# Patient Record
Sex: FEMALE | Race: WHITE | NOT HISPANIC OR LATINO | Employment: FULL TIME | URBAN - NONMETROPOLITAN AREA
[De-identification: names, ages, dates, MRNs, and addresses within clinical notes are randomized per-mention and may not be internally consistent; named-entity substitution may affect disease eponyms.]

---

## 2022-04-29 ENCOUNTER — OFFICE VISIT (OUTPATIENT)
Dept: FAMILY MEDICINE CLINIC | Facility: CLINIC | Age: 52
End: 2022-04-29

## 2022-04-29 VITALS
HEART RATE: 75 BPM | HEIGHT: 64 IN | SYSTOLIC BLOOD PRESSURE: 130 MMHG | DIASTOLIC BLOOD PRESSURE: 80 MMHG | BODY MASS INDEX: 31.58 KG/M2 | OXYGEN SATURATION: 96 % | WEIGHT: 185 LBS

## 2022-04-29 DIAGNOSIS — K76.0 FATTY LIVER DISEASE, NONALCOHOLIC: ICD-10-CM

## 2022-04-29 DIAGNOSIS — K21.9 GASTROESOPHAGEAL REFLUX DISEASE WITHOUT ESOPHAGITIS: Primary | ICD-10-CM

## 2022-04-29 PROCEDURE — 99204 OFFICE O/P NEW MOD 45 MIN: CPT | Performed by: PHYSICIAN ASSISTANT

## 2022-04-29 RX ORDER — PANTOPRAZOLE SODIUM 40 MG/1
TABLET, DELAYED RELEASE ORAL
Qty: 90 TABLET | Refills: 1 | Status: SHIPPED | OUTPATIENT
Start: 2022-04-29 | End: 2022-05-15

## 2022-05-02 ENCOUNTER — TELEPHONE (OUTPATIENT)
Dept: FAMILY MEDICINE CLINIC | Facility: CLINIC | Age: 52
End: 2022-05-02

## 2022-05-03 NOTE — PROGRESS NOTES
"Chief Complaint  Abdominal Pain (Patient states that she has been having abdominal pain do to her hernia and it is causing her to have acid reflux and poor appetite. ) and Labs Only (Patient reports that she recently had labs done at [facility] [place] and was told that she had a fatty liver)    Subjective          Marisabel Burns presents to [facility]  Patient reports going to ER secondary to having abdominal and chest pain.  States she was advised her pain was secondary to reflux and advised to follow up with PCP.  Reports having a CT scan and was diagnosed with a fatty liver she would like to follow up with GI    Abdominal Pain  This is a chronic problem. The current episode started more than 1 month ago. The onset quality is sudden. The problem occurs every several days. The most recent episode lasted 1 days.       Objective   Vital Signs:   /80 (BP Location: Right arm, Patient Position: Sitting, Cuff Size: Adult)   Pulse 75   Ht 162.6 cm (64\")   Wt 83.9 kg (185 lb)   SpO2 96%   BMI 31.76 kg/m²     Physical Exam  Vitals and nursing note reviewed.   Constitutional:       General: She is not in acute distress.     Appearance: Normal appearance.   HENT:      Head: Normocephalic.      Right Ear: Hearing normal.      Left Ear: Hearing normal.   Eyes:      Pupils: Pupils are equal, round, and reactive to light.   Cardiovascular:      Rate and Rhythm: Normal rate and regular rhythm.   Pulmonary:      Effort: Pulmonary effort is normal. No respiratory distress.   Skin:     General: Skin is warm and dry.   Neurological:      Mental Status: She is alert.   Psychiatric:         Mood and Affect: Mood normal.        Result Review :                 Assessment and Plan    Diagnoses and all orders for this visit:    1. Gastroesophageal reflux disease without esophagitis (Primary)  Assessment & Plan:  Patient will start pantoprazole for relief of symptoms    Orders:  -     pantoprazole (Protonix) 40 MG " EC tablet; Take 1 tab po daily for acid reflux  Dispense: 90 tablet; Refill: 1    2. Fatty liver disease, nonalcoholic  Assessment & Plan:  Patient provided with referral to GI    Orders:  -     Ambulatory Referral to Gastroenterology             Follow Up   No follow-ups on file.  Patient was given instructions and counseling regarding her condition or for health maintenance advice. Please see specific information pulled into the AVS if appropriate.

## 2022-05-04 ENCOUNTER — OFFICE VISIT (OUTPATIENT)
Dept: GASTROENTEROLOGY | Facility: CLINIC | Age: 52
End: 2022-05-04

## 2022-05-04 VITALS
HEIGHT: 64 IN | SYSTOLIC BLOOD PRESSURE: 130 MMHG | TEMPERATURE: 96.9 F | WEIGHT: 189 LBS | BODY MASS INDEX: 32.27 KG/M2 | DIASTOLIC BLOOD PRESSURE: 70 MMHG

## 2022-05-04 DIAGNOSIS — K21.9 GASTROESOPHAGEAL REFLUX DISEASE, UNSPECIFIED WHETHER ESOPHAGITIS PRESENT: ICD-10-CM

## 2022-05-04 DIAGNOSIS — K76.0 NAFLD (NONALCOHOLIC FATTY LIVER DISEASE): ICD-10-CM

## 2022-05-04 DIAGNOSIS — R10.13 EPIGASTRIC PAIN: Primary | ICD-10-CM

## 2022-05-04 DIAGNOSIS — Z12.11 SCREEN FOR COLON CANCER: ICD-10-CM

## 2022-05-04 PROCEDURE — 99204 OFFICE O/P NEW MOD 45 MIN: CPT | Performed by: NURSE PRACTITIONER

## 2022-05-04 RX ORDER — ONDANSETRON 4 MG/1
4 TABLET, ORALLY DISINTEGRATING ORAL AS NEEDED
COMMUNITY
Start: 2022-03-30

## 2022-05-04 RX ORDER — DICYCLOMINE HCL 20 MG
20 TABLET ORAL AS NEEDED
COMMUNITY
Start: 2022-03-30

## 2022-05-04 NOTE — PROGRESS NOTES
GASTROENTEROLOGY OFFICE NOTE  Marisabel Burns  [id number]  1970    CARE TEAM  Patient Care Team:  Karlene Ronquillo PA-C as PCP - General (Physician Assistant)    Referring Provider: Karlene Ronquillo PA-C    Chief Complaint   Patient presents with   • Hepatic Disease   • Vomiting   • Diarrhea   • Abdominal Pain   • Nausea   • Heartburn        HISTORY OF PRESENT ILLNESS:  Patient is a 51 year old female with complaints of intermittent epigastric pain associated with nausea. She has history of heartburn that started about a year ago occurring infrequently taking TUMS or omeprazole prn with relief. Over the past few months her heartburn has been more frequent, now having problems almost daily. She recently started on pantoprazole 40 mg daily but took her first dose only yesterday. She denies dysphagia, odynophagia, early satiety.     She reports that she was seen in the ER in [place] recently for complaints of epigastric pain and a CT was done that showed fatty liver. She has never had a colonoscopy.    PAST MEDICAL HISTORY  Past Medical History:   Diagnosis Date   • GERD (gastroesophageal reflux disease)    • Liver disease         PAST SURGICAL HISTORY  Past Surgical History:   Procedure Laterality Date   • HYSTERECTOMY     • TUBAL ABDOMINAL LIGATION          MEDICATIONS:    Current Outpatient Medications:   •  dicyclomine (BENTYL) 20 MG tablet, Take 20 mg by mouth As Needed., Disp: , Rfl:   •  ondansetron ODT (ZOFRAN-ODT) 4 MG disintegrating tablet, Place 4 mg under the tongue As Needed., Disp: , Rfl:   •  pantoprazole (Protonix) 40 MG EC tablet, Take 1 tab po daily for acid reflux, Disp: 90 tablet, Rfl: 1    ALLERGIES  No Known Allergies    FAMILY HISTORY:  Family History   Problem Relation Age of Onset   • Liver cancer Neg Hx    • Liver disease Neg Hx    • Rectal cancer Neg Hx    • Stomach cancer Neg Hx    • Colon cancer Neg Hx        SOCIAL HISTORY  Social History     Socioeconomic History   • Marital status:  "   Tobacco Use   • Smoking status: Former Smoker   • Smokeless tobacco: Never Used   Vaping Use   • Vaping Use: Never used   Substance and Sexual Activity   • Alcohol use: Never   • Drug use: Never   • Sexual activity: Defer         PHYSICAL EXAM   /70 (BP Location: Left arm, Patient Position: Sitting, Cuff Size: Adult)   Temp 96.9 °F (36.1 °C) (Temporal)   Ht 162.6 cm (64\")   Wt 85.7 kg (189 lb)   BMI 32.44 kg/m²   Physical Exam  Vitals and nursing note reviewed.   Constitutional:       Appearance: Normal appearance. She is well-developed.   HENT:      Head: Normocephalic and atraumatic.      Nose: Nose normal.      Mouth/Throat:      Mouth: Mucous membranes are moist.      Pharynx: Oropharynx is clear.   Eyes:      Pupils: Pupils are equal, round, and reactive to light.   Cardiovascular:      Rate and Rhythm: Normal rate and regular rhythm.   Pulmonary:      Effort: Pulmonary effort is normal.      Breath sounds: Normal breath sounds. No wheezing or rales.   Abdominal:      General: Bowel sounds are normal.      Palpations: Abdomen is soft. There is no mass.      Tenderness: There is no abdominal tenderness. There is no guarding or rebound.      Hernia: No hernia is present.   Musculoskeletal:         General: Normal range of motion.      Cervical back: Normal range of motion and neck supple.   Skin:     General: Skin is warm and dry.   Neurological:      Mental Status: She is alert and oriented to person, place, and time.      Cranial Nerves: No cranial nerve deficit.   Psychiatric:         Behavior: Behavior normal.         Judgment: Judgment normal.             ASSESSMENT / PLAN  1. Epigastric pain  2. GERD  -Pantoprazole 40 mg daily  -EGD  3. NAFLD  -Obtain records [place] [facility] [place]  -Weight loss 3-5% with diet and exercise  4. Screen for colon cancer  -Colonoscopy for colorectal cancer screening    Return for Follow up after procedures.    I discussed the patients findings and my recommendations with " patient    Jose Francisco Renteria, APRN

## 2022-05-15 DIAGNOSIS — K21.9 GASTROESOPHAGEAL REFLUX DISEASE WITHOUT ESOPHAGITIS: Primary | ICD-10-CM

## 2022-05-15 RX ORDER — OMEPRAZOLE 40 MG/1
40 CAPSULE, DELAYED RELEASE ORAL DAILY
Qty: 90 CAPSULE | Refills: 1 | Status: SHIPPED | OUTPATIENT
Start: 2022-05-15

## 2022-05-16 ENCOUNTER — TELEPHONE (OUTPATIENT)
Dept: FAMILY MEDICINE CLINIC | Facility: CLINIC | Age: 52
End: 2022-05-16

## 2022-06-21 RX ORDER — SODIUM, POTASSIUM,MAG SULFATES 17.5-3.13G
2 SOLUTION, RECONSTITUTED, ORAL ORAL TAKE AS DIRECTED
Qty: 354 ML | Refills: 0 | Status: SHIPPED | OUTPATIENT
Start: 2022-06-21 | End: 2022-07-27

## 2022-07-06 ENCOUNTER — OUTSIDE FACILITY SERVICE (OUTPATIENT)
Dept: GASTROENTEROLOGY | Facility: CLINIC | Age: 52
End: 2022-07-06

## 2022-07-06 PROCEDURE — 88305 TISSUE EXAM BY PATHOLOGIST: CPT | Performed by: INTERNAL MEDICINE

## 2022-07-06 PROCEDURE — 43239 EGD BIOPSY SINGLE/MULTIPLE: CPT | Performed by: INTERNAL MEDICINE

## 2022-07-06 PROCEDURE — 45378 DIAGNOSTIC COLONOSCOPY: CPT | Performed by: INTERNAL MEDICINE

## 2022-07-07 ENCOUNTER — LAB REQUISITION (OUTPATIENT)
Dept: LAB | Facility: HOSPITAL | Age: 52
End: 2022-07-07

## 2022-07-07 DIAGNOSIS — K44.9 DIAPHRAGMATIC HERNIA WITHOUT OBSTRUCTION OR GANGRENE: ICD-10-CM

## 2022-07-07 DIAGNOSIS — K21.9 GASTRO-ESOPHAGEAL REFLUX DISEASE WITHOUT ESOPHAGITIS: ICD-10-CM

## 2022-07-07 DIAGNOSIS — K20.90 ESOPHAGITIS, UNSPECIFIED WITHOUT BLEEDING: ICD-10-CM

## 2022-07-07 DIAGNOSIS — K29.70 GASTRITIS, UNSPECIFIED, WITHOUT BLEEDING: ICD-10-CM

## 2022-07-07 DIAGNOSIS — R10.13 EPIGASTRIC PAIN: ICD-10-CM

## 2022-07-08 LAB
CYTO UR: NORMAL
LAB AP CASE REPORT: NORMAL
LAB AP CLINICAL INFORMATION: NORMAL
PATH REPORT.FINAL DX SPEC: NORMAL
PATH REPORT.GROSS SPEC: NORMAL

## 2022-07-27 ENCOUNTER — OFFICE VISIT (OUTPATIENT)
Dept: GASTROENTEROLOGY | Facility: CLINIC | Age: 52
End: 2022-07-27

## 2022-07-27 VITALS
TEMPERATURE: 97.5 F | DIASTOLIC BLOOD PRESSURE: 80 MMHG | BODY MASS INDEX: 32.44 KG/M2 | SYSTOLIC BLOOD PRESSURE: 130 MMHG | HEIGHT: 64 IN | WEIGHT: 190 LBS | RESPIRATION RATE: 18 BRPM | HEART RATE: 74 BPM

## 2022-07-27 DIAGNOSIS — K21.00 GASTROESOPHAGEAL REFLUX DISEASE WITH ESOPHAGITIS WITHOUT HEMORRHAGE: Primary | ICD-10-CM

## 2022-07-27 PROCEDURE — 99213 OFFICE O/P EST LOW 20 MIN: CPT | Performed by: NURSE PRACTITIONER

## 2022-07-27 NOTE — PROGRESS NOTES
GASTROENTEROLOGY OFFICE NOTE  Marisabel Burns  [id number]  1970    CARE TEAM  Patient Care Team:  Karlene Ronquillo PA-C as PCP - General (Physician Assistant)    Referring Provider: Karlene Ronquillo PA-C     Chief Complaint   Patient presents with   • follow up after EGD and colonoscopy.    • Nausea        HISTORY OF PRESENT ILLNESS:  Patient returns in follow up s/p EGD for complaints of epigastric pain and nausea and s/p screening colonoscopy.     EGD was significant for esophagitis, gastritis and an early gastric angiodysplasia. Biopsies significant for reflux esophagitis. She has been taking Pantoprazole 40 mg daily and has had complete resolution of her symptoms, as long as she does not miss a dose.     On colonoscopy, there were a few diverticula, no polyps and multiple non-bleeding angio dysplasias throughout the colon (cecum, ascending colon, transverse colon).    PAST MEDICAL HISTORY  Past Medical History:   Diagnosis Date   • GERD (gastroesophageal reflux disease)    • Liver disease         PAST SURGICAL HISTORY  Past Surgical History:   Procedure Laterality Date   • HYSTERECTOMY     • TUBAL ABDOMINAL LIGATION          MEDICATIONS:    Current Outpatient Medications:   •  dicyclomine (BENTYL) 20 MG tablet, Take 20 mg by mouth As Needed., Disp: , Rfl:   •  omeprazole (priLOSEC) 40 MG capsule, Take 1 capsule by mouth Daily., Disp: 90 capsule, Rfl: 1  •  ondansetron ODT (ZOFRAN-ODT) 4 MG disintegrating tablet, Place 4 mg under the tongue As Needed., Disp: , Rfl:     ALLERGIES  No Known Allergies    FAMILY HISTORY:  Family History   Problem Relation Age of Onset   • Liver cancer Neg Hx    • Liver disease Neg Hx    • Rectal cancer Neg Hx    • Stomach cancer Neg Hx    • Colon cancer Neg Hx        SOCIAL HISTORY  Social History     Socioeconomic History   • Marital status:    Tobacco Use   • Smoking status: Former Smoker   • Smokeless tobacco: Never Used   Vaping Use   • Vaping Use: Never used  "  Substance and Sexual Activity   • Alcohol use: Never   • Drug use: Never   • Sexual activity: Defer           PHYSICAL EXAM   /80   Pulse 74   Temp 97.5 °F (36.4 °C)   Resp 18   Ht 162.6 cm (64\")   Wt 86.2 kg (190 lb)   BMI 32.61 kg/m²   Physical Exam  Constitutional:       Appearance: Normal appearance.   HENT:      Head: Normocephalic and atraumatic.   Pulmonary:      Effort: Pulmonary effort is normal.   Abdominal:      General: There is no distension.      Palpations: Abdomen is soft.      Tenderness: There is no abdominal tenderness.   Neurological:      Mental Status: She is alert and oriented to person, place, and time.   Psychiatric:         Mood and Affect: Mood normal.         Thought Content: Thought content normal.           Results Review:  Case Report   Surgical Pathology Report                         Case: [id number]                                   Authorizing Provider:  Xavier Edward MD   Collected:           07/06/2022 01:15 PM           Ordering Location:     [facility]   Received:            07/07/2022 08:31 AM                                  LABORATORY                                                                    Pathologist:           Moses Casillas MD                                                         Specimens:   1) - Small Intestine, Duodenum                                                                       2) - Esophagus, Distal                                                                     Clinical Information    Gastro-esophageal reflux disease without esophagitis  Epigastric pain  Esophagitis, unspecified without bleeding  Gastritis, unspecified, without bleeding  Diaphragmatic hernia without obstruction or gangrene   Final Diagnosis   1.  DUODENUM BIOPSY:  Normal villous architecture with no significant histopathologic change.  No parasites noted.  No villous blunting or increased intraepithelial lymphocytes suggestive of " celiac disease.    2.  DISTAL ESOPHAGUS BIOPSY:  Limited squamous mucosa showing basal layer hyperplasia and increased intraepithelial leukocytes without eosinophils.  Gastric cardia type mucosa showing reactive gastropathic changes with background of mild chronic gastritis without activity.  Histologic changes are suggestive of reflux esophagitis.  Negative for intestinal metaplasia and dysplasia.   Electronically signed by Moses Casillas MD on 7/8/2022 at 1150         ASSESSMENT / PLAN  1. GERD  -Pantoprazole 40 mg daily    Return if symptoms worsen or fail to improve.    I discussed the patients findings and my recommendations with patient    NEYMAR Worrell

## 2024-05-15 ENCOUNTER — OFFICE VISIT (OUTPATIENT)
Dept: FAMILY MEDICINE CLINIC | Facility: CLINIC | Age: 54
End: 2024-05-15
Payer: COMMERCIAL

## 2024-05-15 VITALS
OXYGEN SATURATION: 94 % | BODY MASS INDEX: 34.83 KG/M2 | SYSTOLIC BLOOD PRESSURE: 130 MMHG | HEART RATE: 79 BPM | WEIGHT: 204 LBS | DIASTOLIC BLOOD PRESSURE: 70 MMHG | HEIGHT: 64 IN

## 2024-05-15 DIAGNOSIS — Z00.00 PHYSICAL EXAM: Primary | ICD-10-CM

## 2024-05-15 DIAGNOSIS — I10 PRIMARY HYPERTENSION: ICD-10-CM

## 2024-05-15 DIAGNOSIS — K76.0 FATTY LIVER DISEASE, NONALCOHOLIC: ICD-10-CM

## 2024-05-15 DIAGNOSIS — Z87.891 HISTORY OF TOBACCO USE: ICD-10-CM

## 2024-05-15 DIAGNOSIS — E66.01 MORBID (SEVERE) OBESITY DUE TO EXCESS CALORIES: ICD-10-CM

## 2024-05-15 DIAGNOSIS — Z13.1 SCREENING FOR DIABETES MELLITUS: ICD-10-CM

## 2024-05-15 DIAGNOSIS — Z12.31 SCREENING MAMMOGRAM FOR BREAST CANCER: ICD-10-CM

## 2024-05-15 DIAGNOSIS — K21.00 GASTROESOPHAGEAL REFLUX DISEASE WITH ESOPHAGITIS WITHOUT HEMORRHAGE: ICD-10-CM

## 2024-05-15 DIAGNOSIS — K31.819 GASTRIC AND DUODENAL ANGIODYSPLASIA: ICD-10-CM

## 2024-05-15 PROBLEM — K29.50 CHRONIC GASTRITIS WITHOUT BLEEDING: Status: ACTIVE | Noted: 2024-05-15

## 2024-05-15 PROCEDURE — 99396 PREV VISIT EST AGE 40-64: CPT | Performed by: PHYSICIAN ASSISTANT

## 2024-05-15 PROCEDURE — 99214 OFFICE O/P EST MOD 30 MIN: CPT | Performed by: PHYSICIAN ASSISTANT

## 2024-05-15 RX ORDER — PANTOPRAZOLE SODIUM 40 MG/1
40 TABLET, DELAYED RELEASE ORAL DAILY
COMMUNITY
End: 2024-05-15 | Stop reason: SDUPTHER

## 2024-05-15 RX ORDER — PANTOPRAZOLE SODIUM 40 MG/1
40 TABLET, DELAYED RELEASE ORAL DAILY
Qty: 90 TABLET | Refills: 3 | Status: SHIPPED | OUTPATIENT
Start: 2024-05-15

## 2024-05-15 RX ORDER — LOSARTAN POTASSIUM 25 MG/1
25 TABLET ORAL DAILY
Qty: 30 TABLET | Refills: 1 | Status: SHIPPED | OUTPATIENT
Start: 2024-05-15

## 2024-05-15 NOTE — ASSESSMENT & PLAN NOTE
Patient has new onset Hypertension  Ambulatory BP monitoring  Dietary sodium restriction.  Recommended strategies for weight loss.  Blood pressure will be reassessed in 2 weeks.

## 2024-05-15 NOTE — ASSESSMENT & PLAN NOTE
Patient's (Body mass index is 35.02 kg/m².) indicates that they are morbidly/severely obese (BMI > 40 or > 35 with obesity - related health condition) with health conditions that include GERD . Weight is newly identified. BMI  is above average; BMI management plan is completed. We discussed low calorie, low carb based diet program, portion control, increasing exercise, and referral to weight management .

## 2024-05-15 NOTE — ASSESSMENT & PLAN NOTE
Patient will continue current treatment plan with pantoprazole.  Provided patient with referral to gastroenterology for further workup secondary to chronic esophagitis, gastritis

## 2024-05-15 NOTE — PROGRESS NOTES
"Chief Complaint  Annual Exam and Hypertension    Subjective          Marisabel Burns presents to [facility]  History of Present Illness  Patient reports today for physical exam, medication refills and fasting labs.    Patient reports she has not been seen in the office for the past couple of years.  Patient would like to reestablish care and restart some of her medications.    Patient reports she has been checking her blood pressure at home for the past 6 months and it has been running around 150/70-80.  Patient reports she has been having some dizziness and fatigue.  Patient denies any chest pain, palpitations or shortness of breath.    Patient reports having fatty liver disease, acid reflux with esophagitis and gastritis.  Patient reports she takes pantoprazole daily.  Reports she has not had follow-up with gastroenterologist for the past 2 years.  Patient would like to establish care.    Patient reports she has gained weight over the past few years.  Patient states she has tried multiple diets and exercise regimens however no relief.  Patient would like to follow-up with a specialist regarding weight loss.  Hypertension        Objective   Vital Signs:   /70   Pulse 79   Ht 162.6 cm (64\")   Wt 92.5 kg (204 lb)   SpO2 94%   BMI 35.02 kg/m²     Body mass index is 35.02 kg/m².    Review of Systems    Health Maintenance   Topic Date Due    MAMMOGRAM  Never done    Pneumococcal Vaccine 0-64 (1 of 2 - PCV) Never done    TDAP/TD VACCINES (1 - Tdap) Never done    HEPATITIS C SCREENING  Never done    ANNUAL PHYSICAL  Never done    PAP SMEAR  Never done    ZOSTER VACCINE (1 of 2) Never done    COVID-19 Vaccine (4 - 2023-24 season) 09/01/2023    INFLUENZA VACCINE  08/01/2024    BMI FOLLOWUP  05/15/2025    COLORECTAL CANCER SCREENING  07/07/2032        Past History:  Medical History: has a past medical history of GERD (gastroesophageal reflux disease) and Liver disease.   Surgical " History: has a past surgical history that includes Hysterectomy; Tubal ligation; and Colonoscopy (07/06/22).   Family History: family history includes Asthma in her mother; Heart disease in her father and mother; Hyperlipidemia in her father and mother; Stroke in her father.   Social History: reports that she has quit smoking. Her smoking use included cigarettes. She has a 7.5 pack-year smoking history. She has never used smokeless tobacco. She reports that she does not drink alcohol and does not use drugs.      Current Outpatient Medications:     dicyclomine (BENTYL) 20 MG tablet, Take 1 tablet by mouth As Needed., Disp: , Rfl:     pantoprazole (PROTONIX) 40 MG EC tablet, Take 1 tablet by mouth Daily. For acid reflux, Disp: 90 tablet, Rfl: 3    losartan (Cozaar) 25 MG tablet, Take 1 tablet by mouth Daily. For blood pressure, Disp: 30 tablet, Rfl: 1    Allergies: Patient has no known allergies.    Physical Exam     Result Review :                   Assessment and Plan    Diagnoses and all orders for this visit:    1. Physical exam (Primary)  Assessment & Plan:  Discussed injury prevention, diet and exercise, safe sexual practices, and screening for common diseases. Encouraged use of sunscreen and seatbelts. Encouraged SBE, avoidance of tobacco, limiting alcohol, and yearly dental and eye exams.       Orders:  -     CBC & Differential; Future  -     Comprehensive Metabolic Panel; Future  -     TSH; Future  -     Lipid Panel; Future  -     CBC & Differential  -     Comprehensive Metabolic Panel  -     TSH  -     Lipid Panel    2. Fatty liver disease, nonalcoholic  Assessment & Plan:  See plan above    Orders:  -     Ambulatory Referral to Gastroenterology    3. Screening for diabetes mellitus  Assessment & Plan:  Will check A1c this date.    Orders:  -     Hemoglobin A1c; Future  -     Hemoglobin A1c    4. Morbid (severe) obesity due to excess calories  Assessment & Plan:  Patient's (Body mass index is 35.02 kg/m².)  indicates that they are morbidly/severely obese (BMI > 40 or > 35 with obesity - related health condition) with health conditions that include GERD . Weight is newly identified. BMI  is above average; BMI management plan is completed. We discussed low calorie, low carb based diet program, portion control, increasing exercise, and referral to weight management .     Orders:  -     Ambulatory Referral to Weight Management Program    5. Gastroesophageal reflux disease with esophagitis without hemorrhage  Assessment & Plan:  Patient will continue current treatment plan with pantoprazole.  Provided patient with referral to gastroenterology for further workup secondary to chronic esophagitis, gastritis    Orders:  -     pantoprazole (PROTONIX) 40 MG EC tablet; Take 1 tablet by mouth Daily. For acid reflux  Dispense: 90 tablet; Refill: 3  -     Ambulatory Referral to Gastroenterology    6. Screening mammogram for breast cancer  Assessment & Plan:  Mammogram ordered    Orders:  -     Mammo Screening Bilateral With CAD; Future    7. Gastric and duodenal angiodysplasia  Assessment & Plan:  See plan above    Orders:  -     Ambulatory Referral to Gastroenterology    8. Primary hypertension  Assessment & Plan:  Patient has new onset Hypertension  Ambulatory BP monitoring  Dietary sodium restriction.  Recommended strategies for weight loss.  Blood pressure will be reassessed in 2 weeks.    Orders:  -     losartan (Cozaar) 25 MG tablet; Take 1 tablet by mouth Daily. For blood pressure  Dispense: 30 tablet; Refill: 1    9. History of tobacco use  Assessment & Plan:  Patient is now 3 months tobacco free.  Will order low-dose CT scan of lungs    Orders:  -      CT Chest Low Dose Cancer Screening WO; Future        Follow Up   Return in about 3 weeks (around 6/5/2024) for Recheck.  Patient was given instructions and counseling regarding her condition or for health maintenance advice. Please see specific information pulled into the AVS  if appropriate.     Karlene Ronquillo PA-C

## 2024-05-16 LAB
ALBUMIN SERPL-MCNC: 4.2 G/DL (ref 3.8–4.9)
ALBUMIN/GLOB SERPL: 1.6 {RATIO} (ref 1.2–2.2)
ALP SERPL-CCNC: 159 IU/L (ref 44–121)
ALT SERPL-CCNC: 120 IU/L (ref 0–32)
AST SERPL-CCNC: 91 IU/L (ref 0–40)
BASOPHILS # BLD AUTO: 0 X10E3/UL (ref 0–0.2)
BASOPHILS NFR BLD AUTO: 1 %
BILIRUB SERPL-MCNC: 0.3 MG/DL (ref 0–1.2)
BUN SERPL-MCNC: 8 MG/DL (ref 6–24)
BUN/CREAT SERPL: 11 (ref 9–23)
CALCIUM SERPL-MCNC: 9.2 MG/DL (ref 8.7–10.2)
CHLORIDE SERPL-SCNC: 104 MMOL/L (ref 96–106)
CHOLEST SERPL-MCNC: 181 MG/DL (ref 100–199)
CO2 SERPL-SCNC: 23 MMOL/L (ref 20–29)
CREAT SERPL-MCNC: 0.74 MG/DL (ref 0.57–1)
EGFRCR SERPLBLD CKD-EPI 2021: 96 ML/MIN/1.73
EOSINOPHIL # BLD AUTO: 0.1 X10E3/UL (ref 0–0.4)
EOSINOPHIL NFR BLD AUTO: 1 %
ERYTHROCYTE [DISTWIDTH] IN BLOOD BY AUTOMATED COUNT: 11.7 % (ref 11.7–15.4)
GLOBULIN SER CALC-MCNC: 2.7 G/DL (ref 1.5–4.5)
GLUCOSE SERPL-MCNC: 134 MG/DL (ref 70–99)
HBA1C MFR BLD: 6.5 % (ref 4.8–5.6)
HCT VFR BLD AUTO: 43 % (ref 34–46.6)
HDLC SERPL-MCNC: 35 MG/DL
HGB BLD-MCNC: 14 G/DL (ref 11.1–15.9)
IMM GRANULOCYTES # BLD AUTO: 0 X10E3/UL (ref 0–0.1)
IMM GRANULOCYTES NFR BLD AUTO: 0 %
LDLC SERPL CALC-MCNC: 121 MG/DL (ref 0–99)
LYMPHOCYTES # BLD AUTO: 2.5 X10E3/UL (ref 0.7–3.1)
LYMPHOCYTES NFR BLD AUTO: 35 %
MCH RBC QN AUTO: 29.8 PG (ref 26.6–33)
MCHC RBC AUTO-ENTMCNC: 32.6 G/DL (ref 31.5–35.7)
MCV RBC AUTO: 92 FL (ref 79–97)
MONOCYTES # BLD AUTO: 0.6 X10E3/UL (ref 0.1–0.9)
MONOCYTES NFR BLD AUTO: 9 %
NEUTROPHILS # BLD AUTO: 3.9 X10E3/UL (ref 1.4–7)
NEUTROPHILS NFR BLD AUTO: 54 %
PLATELET # BLD AUTO: 155 X10E3/UL (ref 150–450)
POTASSIUM SERPL-SCNC: 4.1 MMOL/L (ref 3.5–5.2)
PROT SERPL-MCNC: 6.9 G/DL (ref 6–8.5)
RBC # BLD AUTO: 4.7 X10E6/UL (ref 3.77–5.28)
SODIUM SERPL-SCNC: 142 MMOL/L (ref 134–144)
TRIGL SERPL-MCNC: 137 MG/DL (ref 0–149)
TSH SERPL DL<=0.005 MIU/L-ACNC: 2.56 UIU/ML (ref 0.45–4.5)
VLDLC SERPL CALC-MCNC: 25 MG/DL (ref 5–40)
WBC # BLD AUTO: 7.2 X10E3/UL (ref 3.4–10.8)

## 2024-05-21 NOTE — PROGRESS NOTES
Office Note     Name: Marisabel Burns    : 1970     MRN: [id number]     Chief Complaint  Follow-up epigastric pain, nausea    Subjective     History of Present Illness:  Marisabel Burns is a 53 y.o. female, with PMH significant for GERD and recently diagnosed T2DM, who presents today for follow-up of GERD.  EGD 2022 found esophagitis, gastritis, and early gastric angiodysplasia.  Biopsies significant for reflux esophagitis.  Colonoscopy showed a few diverticula, without polyps, and multiple nonbleeding angiodysplasias throughout the colon (cecum, ascending colon, transverse colon).  She previously had complete resolution of GERD symptoms with Protonix 40 mg daily, but has recently started having more symptoms, particularly at night.  She denies any weight loss, nausea, vomiting, abdominal pain, changes in bowel habits, bleeding, or easy bruising.  She had labs drawn last week that were significant for elevated liver enzymes, with alk phos 159, AST 91, and .  No prior labs to compare.  She reports trying to eat healthier, but continuing to gain weight.  She is about to start seeing a weight management doctor.    Next colonoscopy due 2032    ENDOSCOPY, INT (2022)   SCANNED - COLONOSCOPY (2022)   Progress Notes by Jose Francisco Renteria APRN (2022 08:30)     Past Medical History:   Past Medical History:   Diagnosis Date    GERD (gastroesophageal reflux disease)     Liver disease        Past Surgical History:   Past Surgical History:   Procedure Laterality Date    COLONOSCOPY  22    HYSTERECTOMY      TUBAL ABDOMINAL LIGATION         Immunizations:   Immunization History   Administered Date(s) Administered    COVID-19 (MODERNA) 1st,2nd,3rd Dose Monovalent 2021, 05/10/2021    COVID-19 (MODERNA) Monovalent Original Booster 12/15/2021        Medications:     Current Outpatient Medications:     dicyclomine (BENTYL) 20 MG tablet, Take 1 tablet by mouth As Needed., Disp: , Rfl:      "losartan (Cozaar) 25 MG tablet, Take 1 tablet by mouth Daily. For blood pressure, Disp: 30 tablet, Rfl: 1    pantoprazole (PROTONIX) 40 MG EC tablet, Take 1 tablet by mouth Daily. For acid reflux, Disp: 90 tablet, Rfl: 3    Allergies:   No Known Allergies    Family History:   Family History   Problem Relation Age of Onset    Asthma Mother     Heart disease Mother     Hyperlipidemia Mother     Heart disease Father     Hyperlipidemia Father     Stroke Father     Liver cancer Neg Hx     Liver disease Neg Hx     Rectal cancer Neg Hx     Stomach cancer Neg Hx     Colon cancer Neg Hx        Social History:   Social History     Socioeconomic History    Marital status:    Tobacco Use    Smoking status: Former     Current packs/day: 0.50     Average packs/day: 0.5 packs/day for 15.0 years (7.5 ttl pk-yrs)     Types: Cigarettes    Smokeless tobacco: Never   Vaping Use    Vaping status: Never Used   Substance and Sexual Activity    Alcohol use: Never    Drug use: Never    Sexual activity: Not Currently     Partners: Male     Birth control/protection: None         Objective     Vital Signs  Ht 162.6 cm (64\")   Wt 92.5 kg (204 lb)   BMI 35.02 kg/m²   Estimated body mass index is 35.02 kg/m² as calculated from the following:    Height as of this encounter: 162.6 cm (64\").    Weight as of this encounter: 92.5 kg (204 lb).            Physical Exam  Vitals reviewed.   Constitutional:       General: She is awake.   Cardiovascular:      Rate and Rhythm: Normal rate.   Abdominal:      General: Bowel sounds are normal.      Palpations: Abdomen is soft.      Tenderness: There is no abdominal tenderness.   Skin:     General: Skin is warm and dry.      Capillary Refill: Capillary refill takes less than 2 seconds.   Neurological:      Mental Status: She is alert.          Assessment and Plan     Assessment & Plan  Gastroesophageal reflux disease without esophagitis  Taking omeprazole 40 mg daily, with new breakthrough " symptoms.  Will try adding famotidine before last meal of the day, to see if this helps with her symptoms.  She will reach out and let me know if this is working for her.    If ineffective, we will try omeprazole twice daily.      Metabolic dysfunction-associated steatotic liver disease (MASLD)  (5/15/2024): Alk phos 159, AST 91, .  No prior comparison.  Recommend weight loss of 5-7% total body weight, at a rate of 1-2 pounds per week.  Also recommend at least 150 minutes of moderate-vigorous exercise/week.  Patient is also about to start seeing a weight management doctor.  Will recheck liver enzymes in 6 months.  If not improved with lifestyle modifications, will look for alternative causes of elevation of liver enzymes.  Recommend vaccination for hepatitis a and B at local health department, pharmacy, or our office.      Hyperlipidemia associated with type 2 diabetes mellitus   Lipid abnormalities are newly identified    Plan:      Discussed medication dosage, use, side effects, and goals of treatment in detail.    Counseled patient on lifestyle modifications to help control hyperlipidemia.     Patient Treatment Goals:   LDL goal is less than 70    Followup in 6 months.    Orders Placed This Encounter   Procedures    Comprehensive metabolic panel    Lipid panel    Protime-INR    CBC and differential   Recent LDL of 121.  Goal LDL less than 70 for patients with diabetes.  Recommend lifestyle and dietary modification as discussed above.  Will recheck lipids in 6 months, and if not improved, will consider initiating statin, to reduce complications of fatty liver disease.              Follow Up  Follow-up in 6 months to discuss labs for elevated liver enzymes.     NEYMAR Holbrook  [facility]0  [facility] GASTROENTEROLOGY  [address] [place] [id number]

## 2024-05-22 ENCOUNTER — OFFICE VISIT (OUTPATIENT)
Dept: GASTROENTEROLOGY | Facility: CLINIC | Age: 54
End: 2024-05-22
Payer: COMMERCIAL

## 2024-05-22 VITALS — BODY MASS INDEX: 34.83 KG/M2 | HEIGHT: 64 IN | WEIGHT: 204 LBS

## 2024-05-22 DIAGNOSIS — K21.9 GASTROESOPHAGEAL REFLUX DISEASE WITHOUT ESOPHAGITIS: Primary | ICD-10-CM

## 2024-05-22 DIAGNOSIS — K76.0 METABOLIC DYSFUNCTION-ASSOCIATED STEATOTIC LIVER DISEASE (MASLD): ICD-10-CM

## 2024-05-22 DIAGNOSIS — E78.5 HYPERLIPIDEMIA ASSOCIATED WITH TYPE 2 DIABETES MELLITUS: ICD-10-CM

## 2024-05-22 DIAGNOSIS — E11.69 HYPERLIPIDEMIA ASSOCIATED WITH TYPE 2 DIABETES MELLITUS: ICD-10-CM

## 2024-05-22 PROCEDURE — 99214 OFFICE O/P EST MOD 30 MIN: CPT

## 2024-05-22 NOTE — ASSESSMENT & PLAN NOTE
Taking omeprazole 40 mg daily, with new breakthrough symptoms.  Will try adding famotidine before last meal of the day, to see if this helps with her symptoms.  She will reach out and let me know if this is working for her.    If ineffective, we will try omeprazole twice daily.

## 2024-05-22 NOTE — PATIENT INSTRUCTIONS
For ongoing reflux symptoms, will try famotidine (Pepcid), over the counter, before last meal of the day.   Recommend weight loss of 5-7% of total body weight, and 150 min/week of vigorous exercise.   Continue the protonix. Reach out when refills needed.   Return for follow-up in 6 months, after labs for liver.

## 2024-06-05 ENCOUNTER — OFFICE VISIT (OUTPATIENT)
Dept: FAMILY MEDICINE CLINIC | Facility: CLINIC | Age: 54
End: 2024-06-05
Payer: COMMERCIAL

## 2024-06-05 VITALS
SYSTOLIC BLOOD PRESSURE: 120 MMHG | HEIGHT: 64 IN | OXYGEN SATURATION: 96 % | WEIGHT: 201 LBS | DIASTOLIC BLOOD PRESSURE: 82 MMHG | HEART RATE: 81 BPM | BODY MASS INDEX: 34.31 KG/M2

## 2024-06-05 DIAGNOSIS — E11.9 TYPE 2 DIABETES MELLITUS WITHOUT COMPLICATION, WITHOUT LONG-TERM CURRENT USE OF INSULIN: Primary | ICD-10-CM

## 2024-06-05 DIAGNOSIS — I10 PRIMARY HYPERTENSION: ICD-10-CM

## 2024-06-05 LAB
EXPIRATION DATE: NORMAL
Lab: NORMAL
POC CREATININE URINE: 200
POC MICROALBUMIN URINE: 30

## 2024-06-05 PROCEDURE — 82044 UR ALBUMIN SEMIQUANTITATIVE: CPT | Performed by: PHYSICIAN ASSISTANT

## 2024-06-05 PROCEDURE — 99214 OFFICE O/P EST MOD 30 MIN: CPT | Performed by: PHYSICIAN ASSISTANT

## 2024-06-05 RX ORDER — SEMAGLUTIDE 0.68 MG/ML
0.5 INJECTION, SOLUTION SUBCUTANEOUS WEEKLY
Qty: 3 ML | Refills: 2 | Status: SHIPPED | OUTPATIENT
Start: 2024-06-05

## 2024-06-05 RX ORDER — BENZONATATE 200 MG/1
200 CAPSULE ORAL 2 TIMES DAILY PRN
COMMUNITY
Start: 2024-06-03

## 2024-06-05 RX ORDER — CEFDINIR 300 MG/1
1 CAPSULE ORAL EVERY 12 HOURS SCHEDULED
COMMUNITY
Start: 2024-06-03

## 2024-06-05 RX ORDER — PREDNISONE 20 MG/1
2 TABLET ORAL DAILY
COMMUNITY
Start: 2024-06-03

## 2024-06-05 RX ORDER — LOSARTAN POTASSIUM 50 MG/1
50 TABLET ORAL DAILY
Qty: 90 TABLET | Refills: 1 | Status: SHIPPED | OUTPATIENT
Start: 2024-06-05

## 2024-06-05 NOTE — ASSESSMENT & PLAN NOTE
Diabetes is newly identified.   Medication changes per orders.  Recommended an ADA diet.  Regular aerobic exercise.  Discussed foot care.  Ophthalmology/Optometry referral.  Patient prescribed Ozempic.  Provided with starter kit this date.  Patient denies any history of pancreatitis denies any family history of thyroid cancer.  Diabetes will be reassessed in 3 months

## 2024-06-05 NOTE — ASSESSMENT & PLAN NOTE
Hypertension is uncontrolled  Medication changes per orders.  Blood pressure will be reassessedin 4 weeks.  Will increase losartan to 50 mg

## 2024-06-05 NOTE — PROGRESS NOTES
"Chief Complaint  Hypertension (F/u ) and Diabetes    Subjective        Marisabel Burns presents to [facility]  History of Present Illness  Patient reports today for follow-up of blood pressure secondary to starting losartan last visit.  Patient reports her blood pressure is improving however it remains around 90 on the bottom.  Patient states she has not had any chest pain, palpitations or shortness of breath.  Patient reports she feels better now that her blood pressure is lower.    Patient states she was diagnosed with diabetes after last blood work.  Would like to discuss medications this date.  Diabetes        Objective   Vital Signs:  /82   Pulse 81   Ht 162.6 cm (64\")   Wt 91.2 kg (201 lb)   SpO2 96%   BMI 34.50 kg/m²   Estimated body mass index is 34.5 kg/m² as calculated from the following:    Height as of this encounter: 162.6 cm (64\").    Weight as of this encounter: 91.2 kg (201 lb).               Physical Exam  Vitals and nursing note reviewed.   Constitutional:       General: She is not in acute distress.     Appearance: Normal appearance.   HENT:      Head: Normocephalic.      Right Ear: Hearing normal.      Left Ear: Hearing normal.   Eyes:      Pupils: Pupils are equal, round, and reactive to light.   Cardiovascular:      Rate and Rhythm: Normal rate and regular rhythm.   Pulmonary:      Effort: Pulmonary effort is normal. No respiratory distress.   Skin:     General: Skin is warm and dry.   Neurological:      Mental Status: She is alert.   Psychiatric:         Mood and Affect: Mood normal.        Result Review :                     Assessment and Plan     Diagnoses and all orders for this visit:    1. Type 2 diabetes mellitus without complication, without long-term current use of insulin (Primary)  Assessment & Plan:  Diabetes is newly identified.   Medication changes per orders.  Recommended an ADA diet.  Regular aerobic exercise.  Discussed foot " care.  Ophthalmology/Optometry referral.  Patient prescribed Ozempic.  Provided with starter kit this date.  Patient denies any history of pancreatitis denies any family history of thyroid cancer.  Diabetes will be reassessed in 3 months    Orders:  -     Semaglutide,0.25 or 0.5MG/DOS, (Ozempic, 0.25 or 0.5 MG/DOSE,) 2 MG/3ML solution pen-injector; Inject 0.5 mg under the skin into the appropriate area as directed 1 (One) Time Per Week.  Dispense: 3 mL; Refill: 2  -     POCT microalbumin; Future  -     POCT microalbumin    2. Primary hypertension  Assessment & Plan:  Hypertension is uncontrolled  Medication changes per orders.  Blood pressure will be reassessedin 4 weeks.  Will increase losartan to 50 mg    Orders:  -     losartan (Cozaar) 50 MG tablet; Take 1 tablet by mouth Daily. For blood pressure  Dispense: 90 tablet; Refill: 1             Follow Up     Return in about 3 months (around 9/5/2024) for Recheck.  Patient was given instructions and counseling regarding her condition or for health maintenance advice. Please see specific information pulled into the AVS if appropriate.

## 2024-06-10 ENCOUNTER — TELEPHONE (OUTPATIENT)
Dept: FAMILY MEDICINE CLINIC | Facility: CLINIC | Age: 54
End: 2024-06-10
Payer: COMMERCIAL

## 2024-08-20 DIAGNOSIS — E11.9 TYPE 2 DIABETES MELLITUS WITHOUT COMPLICATION, WITHOUT LONG-TERM CURRENT USE OF INSULIN: Primary | ICD-10-CM

## 2024-08-22 ENCOUNTER — TELEPHONE (OUTPATIENT)
Dept: FAMILY MEDICINE CLINIC | Facility: CLINIC | Age: 54
End: 2024-08-22
Payer: COMMERCIAL

## 2024-09-04 ENCOUNTER — OFFICE VISIT (OUTPATIENT)
Dept: FAMILY MEDICINE CLINIC | Facility: CLINIC | Age: 54
End: 2024-09-04
Payer: COMMERCIAL

## 2024-09-04 VITALS
HEART RATE: 70 BPM | WEIGHT: 200.1 LBS | OXYGEN SATURATION: 97 % | HEIGHT: 64 IN | SYSTOLIC BLOOD PRESSURE: 120 MMHG | BODY MASS INDEX: 34.16 KG/M2 | DIASTOLIC BLOOD PRESSURE: 80 MMHG

## 2024-09-04 DIAGNOSIS — E11.9 TYPE 2 DIABETES MELLITUS WITHOUT COMPLICATION, WITHOUT LONG-TERM CURRENT USE OF INSULIN: Primary | ICD-10-CM

## 2024-09-04 DIAGNOSIS — R11.12 PROJECTILE VOMITING WITH NAUSEA: ICD-10-CM

## 2024-09-04 LAB
EXPIRATION DATE: ABNORMAL
HBA1C MFR BLD: 6.7 % (ref 4.5–5.7)
Lab: ABNORMAL

## 2024-09-04 PROCEDURE — 99213 OFFICE O/P EST LOW 20 MIN: CPT | Performed by: PHYSICIAN ASSISTANT

## 2024-09-04 PROCEDURE — 83036 HEMOGLOBIN GLYCOSYLATED A1C: CPT | Performed by: PHYSICIAN ASSISTANT

## 2024-09-05 PROBLEM — R11.12 PROJECTILE VOMITING WITH NAUSEA: Status: ACTIVE | Noted: 2024-09-05

## 2024-09-05 NOTE — PROGRESS NOTES
"Chief Complaint  Follow-up    Subjective        Marisabel Burns presents to [facility]  History of Present Illness  Patient reports today for follow-up secondary to being diagnosed with type 2 diabetes a few months ago.  Patient states she started taking Mounjaro and after the second shot she developed nausea vomiting and diarrhea.  Patient states she went to ER in [place] and was advised she is was dehydrated.  Patient states she received fluids and now feels better.  Patient does not wish to continue Mounjaro or any other medications in the class.  Patient would like to know what else she can take for diabetes.  Patient states she has paperwork needs to be completed for her employer  Follow-up      Objective   Vital Signs:  /80   Pulse 70   Ht 162.6 cm (64\")   Wt 90.8 kg (200 lb 1.6 oz)   SpO2 97%   BMI 34.35 kg/m²   Estimated body mass index is 34.35 kg/m² as calculated from the following:    Height as of this encounter: 162.6 cm (64\").    Weight as of this encounter: 90.8 kg (200 lb 1.6 oz).            Physical Exam  Vitals and nursing note reviewed.   Constitutional:       General: She is not in acute distress.     Appearance: Normal appearance.   HENT:      Head: Normocephalic.      Right Ear: Hearing normal.      Left Ear: Hearing normal.   Eyes:      Pupils: Pupils are equal, round, and reactive to light.   Cardiovascular:      Rate and Rhythm: Normal rate and regular rhythm.   Pulmonary:      Effort: Pulmonary effort is normal. No respiratory distress.   Skin:     General: Skin is warm and dry.   Neurological:      Mental Status: She is alert.   Psychiatric:         Mood and Affect: Mood normal.        Result Review :                Assessment and Plan   Diagnoses and all orders for this visit:    1. Type 2 diabetes mellitus without complication, without long-term current use of insulin (Primary)  Assessment & Plan:  Patient was unable to tolerate Mounjaro.  Patient " prescribed Jardiance.  Advised her to increase fluids as tolerated.  Discussed ADA diet and importance of exercise.  She will return to clinic for follow-up in 3 months    Orders:  -     POCT glycated hemoglobin, total  -     empagliflozin (Jardiance) 10 MG tablet tablet; Take 1 tablet by mouth Daily. For diabetes  Dispense: 90 tablet; Refill: 1    2. Projectile vomiting with nausea  Assessment & Plan:  Resolved upon discharge from ER               Follow Up   Return in about 3 months (around 12/4/2024).  Patient was given instructions and counseling regarding her condition or for health maintenance advice. Please see specific information pulled into the AVS if appropriate.

## 2024-09-05 NOTE — ASSESSMENT & PLAN NOTE
Patient was unable to tolerate Mounjaro.  Patient prescribed Jardiance.  Advised her to increase fluids as tolerated.  Discussed ADA diet and importance of exercise.  She will return to clinic for follow-up in 3 months

## 2024-10-09 RX ORDER — FLUCONAZOLE 150 MG/1
150 TABLET ORAL ONCE
Qty: 1 TABLET | Refills: 0 | Status: SHIPPED | OUTPATIENT
Start: 2024-10-09 | End: 2024-10-09

## 2024-11-06 ENCOUNTER — OFFICE VISIT (OUTPATIENT)
Dept: GASTROENTEROLOGY | Facility: CLINIC | Age: 54
End: 2024-11-06
Payer: COMMERCIAL

## 2024-11-06 ENCOUNTER — LAB (OUTPATIENT)
Dept: LAB | Facility: HOSPITAL | Age: 54
End: 2024-11-06
Payer: COMMERCIAL

## 2024-11-06 VITALS
BODY MASS INDEX: 33.32 KG/M2 | WEIGHT: 195.2 LBS | TEMPERATURE: 97.7 F | HEIGHT: 64 IN | SYSTOLIC BLOOD PRESSURE: 128 MMHG | DIASTOLIC BLOOD PRESSURE: 57 MMHG | HEART RATE: 79 BPM

## 2024-11-06 DIAGNOSIS — K21.00 GASTROESOPHAGEAL REFLUX DISEASE WITH ESOPHAGITIS WITHOUT HEMORRHAGE: ICD-10-CM

## 2024-11-06 DIAGNOSIS — K76.0 METABOLIC DYSFUNCTION-ASSOCIATED STEATOTIC LIVER DISEASE (MASLD): Primary | ICD-10-CM

## 2024-11-06 DIAGNOSIS — K74.3 PRIMARY BILIARY CHOLANGITIS: ICD-10-CM

## 2024-11-06 DIAGNOSIS — K76.0 METABOLIC DYSFUNCTION-ASSOCIATED STEATOTIC LIVER DISEASE (MASLD): ICD-10-CM

## 2024-11-06 LAB
ALBUMIN SERPL-MCNC: 4.2 G/DL (ref 3.5–5.2)
ALBUMIN/GLOB SERPL: 1.2 G/DL
ALP SERPL-CCNC: 158 U/L (ref 39–117)
ALPHA1 GLOB MFR UR ELPH: 185 MG/DL (ref 90–200)
ALT SERPL W P-5'-P-CCNC: 62 U/L (ref 1–33)
ANION GAP SERPL CALCULATED.3IONS-SCNC: 9.5 MMOL/L (ref 5–15)
AST SERPL-CCNC: 66 U/L (ref 1–32)
BASOPHILS # BLD AUTO: 0.04 10*3/MM3 (ref 0–0.2)
BASOPHILS NFR BLD AUTO: 0.5 % (ref 0–1.5)
BILIRUB SERPL-MCNC: 0.3 MG/DL (ref 0–1.2)
BUN SERPL-MCNC: 8 MG/DL (ref 6–20)
BUN/CREAT SERPL: 10.3 (ref 7–25)
CALCIUM SPEC-SCNC: 9.6 MG/DL (ref 8.6–10.5)
CHLORIDE SERPL-SCNC: 106 MMOL/L (ref 98–107)
CHOLEST SERPL-MCNC: 186 MG/DL (ref 0–200)
CO2 SERPL-SCNC: 27.5 MMOL/L (ref 22–29)
CREAT SERPL-MCNC: 0.78 MG/DL (ref 0.57–1)
DEPRECATED RDW RBC AUTO: 36.6 FL (ref 37–54)
EGFRCR SERPLBLD CKD-EPI 2021: 89.8 ML/MIN/1.73
EOSINOPHIL # BLD AUTO: 0.1 10*3/MM3 (ref 0–0.4)
EOSINOPHIL NFR BLD AUTO: 1.2 % (ref 0.3–6.2)
ERYTHROCYTE [DISTWIDTH] IN BLOOD BY AUTOMATED COUNT: 11.4 % (ref 12.3–15.4)
FERRITIN SERPL-MCNC: 254 NG/ML (ref 13–150)
GLOBULIN UR ELPH-MCNC: 3.5 GM/DL
GLUCOSE SERPL-MCNC: 103 MG/DL (ref 65–99)
HBV SURFACE AB SER RIA-ACNC: NORMAL
HBV SURFACE AG SERPL QL IA: NORMAL
HCT VFR BLD AUTO: 42.7 % (ref 34–46.6)
HCV AB SER QL: NORMAL
HDLC SERPL-MCNC: 39 MG/DL (ref 40–60)
HGB BLD-MCNC: 14.4 G/DL (ref 12–15.9)
IMM GRANULOCYTES # BLD AUTO: 0.03 10*3/MM3 (ref 0–0.05)
IMM GRANULOCYTES NFR BLD AUTO: 0.4 % (ref 0–0.5)
INR PPP: 1 (ref 0.89–1.12)
IRON 24H UR-MRATE: 78 MCG/DL (ref 37–145)
LDLC SERPL CALC-MCNC: 123 MG/DL (ref 0–100)
LDLC/HDLC SERPL: 3.07 {RATIO}
LYMPHOCYTES # BLD AUTO: 2.56 10*3/MM3 (ref 0.7–3.1)
LYMPHOCYTES NFR BLD AUTO: 31.1 % (ref 19.6–45.3)
MCH RBC QN AUTO: 29.8 PG (ref 26.6–33)
MCHC RBC AUTO-ENTMCNC: 33.7 G/DL (ref 31.5–35.7)
MCV RBC AUTO: 88.4 FL (ref 79–97)
MONOCYTES # BLD AUTO: 0.62 10*3/MM3 (ref 0.1–0.9)
MONOCYTES NFR BLD AUTO: 7.5 % (ref 5–12)
NEUTROPHILS NFR BLD AUTO: 4.87 10*3/MM3 (ref 1.7–7)
NEUTROPHILS NFR BLD AUTO: 59.3 % (ref 42.7–76)
NRBC BLD AUTO-RTO: 0 /100 WBC (ref 0–0.2)
PLATELET # BLD AUTO: 190 10*3/MM3 (ref 140–450)
PMV BLD AUTO: 11.8 FL (ref 6–12)
POTASSIUM SERPL-SCNC: 4.1 MMOL/L (ref 3.5–5.2)
PROT SERPL-MCNC: 7.7 G/DL (ref 6–8.5)
PROTHROMBIN TIME: 13.2 SECONDS (ref 12.2–14.5)
RBC # BLD AUTO: 4.83 10*6/MM3 (ref 3.77–5.28)
SODIUM SERPL-SCNC: 143 MMOL/L (ref 136–145)
TRIGL SERPL-MCNC: 136 MG/DL (ref 0–150)
VLDLC SERPL-MCNC: 24 MG/DL (ref 5–40)
WBC NRBC COR # BLD AUTO: 8.22 10*3/MM3 (ref 3.4–10.8)

## 2024-11-06 PROCEDURE — 80053 COMPREHEN METABOLIC PANEL: CPT

## 2024-11-06 PROCEDURE — 80061 LIPID PANEL: CPT

## 2024-11-06 PROCEDURE — 85610 PROTHROMBIN TIME: CPT

## 2024-11-06 PROCEDURE — 83540 ASSAY OF IRON: CPT

## 2024-11-06 PROCEDURE — 86015 ACTIN ANTIBODY EACH: CPT

## 2024-11-06 PROCEDURE — 86803 HEPATITIS C AB TEST: CPT

## 2024-11-06 PROCEDURE — 86038 ANTINUCLEAR ANTIBODIES: CPT

## 2024-11-06 PROCEDURE — 81256 HFE GENE: CPT

## 2024-11-06 PROCEDURE — 82977 ASSAY OF GGT: CPT

## 2024-11-06 PROCEDURE — 36415 COLL VENOUS BLD VENIPUNCTURE: CPT

## 2024-11-06 PROCEDURE — 82465 ASSAY BLD/SERUM CHOLESTEROL: CPT

## 2024-11-06 PROCEDURE — 99214 OFFICE O/P EST MOD 30 MIN: CPT

## 2024-11-06 PROCEDURE — 87340 HEPATITIS B SURFACE AG IA: CPT

## 2024-11-06 PROCEDURE — 86381 MITOCHONDRIAL ANTIBODY EACH: CPT

## 2024-11-06 PROCEDURE — 86708 HEPATITIS A ANTIBODY: CPT

## 2024-11-06 PROCEDURE — 83010 ASSAY OF HAPTOGLOBIN QUANT: CPT

## 2024-11-06 PROCEDURE — 82103 ALPHA-1-ANTITRYPSIN TOTAL: CPT

## 2024-11-06 PROCEDURE — 82728 ASSAY OF FERRITIN: CPT

## 2024-11-06 PROCEDURE — 86706 HEP B SURFACE ANTIBODY: CPT

## 2024-11-06 PROCEDURE — 85025 COMPLETE CBC W/AUTO DIFF WBC: CPT

## 2024-11-06 PROCEDURE — 82172 ASSAY OF APOLIPOPROTEIN: CPT

## 2024-11-06 PROCEDURE — 86704 HEP B CORE ANTIBODY TOTAL: CPT

## 2024-11-06 PROCEDURE — 83883 ASSAY NEPHELOMETRY NOT SPEC: CPT

## 2024-11-06 PROCEDURE — 84478 ASSAY OF TRIGLYCERIDES: CPT

## 2024-11-06 RX ORDER — PANTOPRAZOLE SODIUM 40 MG/1
40 TABLET, DELAYED RELEASE ORAL DAILY
Qty: 90 TABLET | Refills: 3 | Status: SHIPPED | OUTPATIENT
Start: 2024-11-06

## 2024-11-06 NOTE — ASSESSMENT & PLAN NOTE
Orders:    pantoprazole (PROTONIX) 40 MG EC tablet; Take 1 tablet by mouth Daily. For acid reflux

## 2024-11-06 NOTE — PROGRESS NOTES
Office Note     Name: Marisabel Burns    : 1970     MRN: [id number]     Chief Complaint  Heartburn and Hepatic Disease    Subjective     History of Present Illness:  Marisabel uBrns is a 54 y.o. female who presents today for follow-up of GERD and metabolic dysfunction associated steatotic liver disease.  At her last appointment she was complaining of breakthrough symptoms on pantoprazole 40 mg daily, and was switched to twice daily dosing. Today she reports that she has actually been doing well on once-daily dosing and has not had to continue taking twice daily. She denies any GI symptoms at this time.     Past Medical History:   Past Medical History:   Diagnosis Date    GERD (gastroesophageal reflux disease)     Liver disease        Past Surgical History:   Past Surgical History:   Procedure Laterality Date    COLONOSCOPY  21    HYSTERECTOMY      TUBAL ABDOMINAL LIGATION         Immunizations:   Immunization History   Administered Date(s) Administered    COVID-19 (MODERNA) 1st,2nd,3rd Dose Monovalent 2021, 05/10/2021    COVID-19 (MODERNA) Monovalent Original Booster 12/15/2021        Medications:     Current Outpatient Medications:     dicyclomine (BENTYL) 20 MG tablet, Take 1 tablet by mouth As Needed., Disp: , Rfl:     empagliflozin (Jardiance) 10 MG tablet tablet, Take 1 tablet by mouth Daily. For diabetes, Disp: 90 tablet, Rfl: 1    losartan (Cozaar) 50 MG tablet, Take 1 tablet by mouth Daily. For blood pressure, Disp: 90 tablet, Rfl: 1    pantoprazole (PROTONIX) 40 MG EC tablet, Take 1 tablet by mouth Daily. For acid reflux, Disp: 90 tablet, Rfl: 3    benzonatate (TESSALON) 200 MG capsule, Take 1 capsule by mouth 2 (Two) Times a Day As Needed. (Patient not taking: Reported on 2024), Disp: , Rfl:     cefdinir (OMNICEF) 300 MG capsule, Take 1 capsule by mouth Every 12 (Twelve) Hours. (Patient not taking: Reported on 2024), Disp: , Rfl:     predniSONE (DELTASONE) 20 MG tablet, Take 2  "tablets by mouth Daily. (Patient not taking: Reported on 11/6/2024), Disp: , Rfl:     Allergies:   Allergies   Allergen Reactions    Mounjaro [Tirzepatide] GI Intolerance       Family History:   Family History   Problem Relation Age of Onset    Asthma Mother     Heart disease Mother     Hyperlipidemia Mother     Heart disease Father     Hyperlipidemia Father     Stroke Father     Liver cancer Neg Hx     Liver disease Neg Hx     Rectal cancer Neg Hx     Stomach cancer Neg Hx     Colon cancer Neg Hx        Social History:   Social History     Socioeconomic History    Marital status:    Tobacco Use    Smoking status: Every Day     Current packs/day: 0.50     Average packs/day: 0.5 packs/day for 15.0 years (7.5 ttl pk-yrs)     Types: Cigarettes    Smokeless tobacco: Never   Vaping Use    Vaping status: Some Days    Substances: Nicotine   Substance and Sexual Activity    Alcohol use: Never    Drug use: Never    Sexual activity: Not Currently     Partners: Male     Birth control/protection: None         Objective     Vital Signs  /57 (BP Location: Left arm, Patient Position: Sitting, Cuff Size: Adult)   Pulse 79   Temp 97.7 °F (36.5 °C) (Temporal)   Ht 162.6 cm (64\")   Wt 88.5 kg (195 lb 3.2 oz)   BMI 33.51 kg/m²   Estimated body mass index is 33.51 kg/m² as calculated from the following:    Height as of this encounter: 162.6 cm (64\").    Weight as of this encounter: 88.5 kg (195 lb 3.2 oz).            Physical Exam  Vitals reviewed.   Constitutional:       General: She is awake.   Cardiovascular:      Rate and Rhythm: Normal rate.   Pulmonary:      Effort: Pulmonary effort is normal.   Abdominal:      Palpations: Abdomen is soft.      Tenderness: There is no abdominal tenderness.   Skin:     General: Skin is warm and dry.   Neurological:      Mental Status: She is alert.          Assessment and Plan     Assessment & Plan  Metabolic dysfunction-associated steatotic liver disease (MASLD)  Check additional " labs to rule out infectious, autoimmune, hereditary etiology.    Orders:    Mitochondrial Antibodies, M2; Future    Anti-Smooth Muscle Antibody Titer; Future    MICHAEL    Alpha - 1 - Antitrypsin; Future    Ferritin; Future    Hepatitis A Antibody, Total; Future    Iron; Future    Hepatitis B Core Antibody, Total; Future    Hepatitis B Surface Antibody; Future    Hepatitis B Surface Antigen; Future    Hepatitis C Antibody; Future    Hemochromatosis Mutation; Future    DUNCAN Fibrosure Plus; Future    Gastroesophageal reflux disease with esophagitis without hemorrhage    Orders:    pantoprazole (PROTONIX) 40 MG EC tablet; Take 1 tablet by mouth Daily. For acid reflux    Primary biliary cholangitis  Addendum 11/8: Elevated mitochondrial antibodies and ALP, with no obstruction on recent gallbladder US. Consistent with PBC. Discussed this with patient.   Begin ursodiol 500 mg twice daily.   Avoid alcohol and other liver toxic substances.   Follow-up in 6 months with repeat labs.   Annual labs will include vitamin A, vitamin D, and PT, as well as TSH. These were check 11/6.  Bone mineral density scan every 2 years    Orders:    ursodiol (ACTIGALL) 500 MG tablet; Take 1 tablet by mouth 2 (Two) Times a Day. If side effects occur, may try taking both doses (1000 mg) by mouth once nightly at bedtime.    Comprehensive Metabolic Panel; Future         Follow Up  6 months, with labs prior to office visit    NEYMAR Holbrook  [facility] [address]  [facility]  [address] [place] [id number]

## 2024-11-07 LAB
ANA SER QL: NEGATIVE
HAV AB SER QL IA: NEGATIVE
HBV CORE AB SERPL QL IA: NEGATIVE
MITOCHONDRIA M2 IGG SER-ACNC: 52.8 UNITS (ref 0–20)
SMA IGG SER-ACNC: 5 UNITS (ref 0–19)

## 2024-11-08 RX ORDER — URSODIOL 500 MG/1
500 TABLET, FILM COATED ORAL 2 TIMES DAILY
Qty: 60 TABLET | Refills: 11 | Status: SHIPPED | OUTPATIENT
Start: 2024-11-08 | End: 2025-11-08

## 2024-11-09 LAB
A2 MACROGLOB SERPL-MCNC: 176 MG/DL (ref 110–276)
ALT SERPL W P-5'-P-CCNC: 73 IU/L (ref 0–40)
APO A-I SERPL-MCNC: 129 MG/DL (ref 116–209)
AST SERPL W P-5'-P-CCNC: 79 IU/L (ref 0–40)
BILIRUB SERPL-MCNC: 0.1 MG/DL (ref 0–1.2)
CHOLEST SERPL-MCNC: 189 MG/DL (ref 100–199)
FIBROSIS SCORING:: ABNORMAL
FIBROSIS STAGE SERPL QL: ABNORMAL
GGT SERPL-CCNC: 37 IU/L (ref 0–60)
GLUCOSE SERPL-MCNC: 107 MG/DL (ref 70–99)
HAPTOGLOB SERPL-MCNC: 207 MG/DL (ref 33–346)
LABORATORY COMMENT REPORT: ABNORMAL
LIVER FIBR SCORE SERPL CALC.FIBROSURE: 0.05 (ref 0–0.21)
LIVER STEATOSIS GRADE SERPL QL: ABNORMAL
LIVER STEATOSIS SCORE SERPL: 0.73 (ref 0–0.4)
NASH GRADE SERPL QL: ABNORMAL
NASH INTERPRETATION SERPL-IMP: ABNORMAL
NASH SCORE SERPL: 0.66 (ref 0–0.25)
NASH SCORING: ABNORMAL
STEATOSIS SCORING: ABNORMAL
TEST PERFORMANCE INFO SPEC: ABNORMAL
TEST PERFORMANCE INFO SPEC: ABNORMAL
TRIGL SERPL-MCNC: 166 MG/DL (ref 0–149)

## 2024-11-13 LAB
HFE GENE MUT ANL BLD/T: NORMAL
IMP & REVIEW OF LAB RESULTS: NORMAL

## 2024-11-27 DIAGNOSIS — I10 PRIMARY HYPERTENSION: ICD-10-CM

## 2024-11-27 RX ORDER — LOSARTAN POTASSIUM 50 MG/1
50 TABLET ORAL DAILY
Qty: 90 TABLET | Refills: 0 | Status: SHIPPED | OUTPATIENT
Start: 2024-11-27

## 2024-12-05 ENCOUNTER — TRANSCRIBE ORDERS (OUTPATIENT)
Dept: CT IMAGING | Facility: CLINIC | Age: 54
End: 2024-12-05
Payer: COMMERCIAL

## 2024-12-05 DIAGNOSIS — Z12.31 ENCOUNTER FOR SCREENING MAMMOGRAM FOR MALIGNANT NEOPLASM OF BREAST: Primary | ICD-10-CM

## 2024-12-10 ENCOUNTER — OFFICE VISIT (OUTPATIENT)
Dept: FAMILY MEDICINE CLINIC | Facility: CLINIC | Age: 54
End: 2024-12-10
Payer: COMMERCIAL

## 2024-12-10 VITALS
HEART RATE: 80 BPM | SYSTOLIC BLOOD PRESSURE: 122 MMHG | OXYGEN SATURATION: 98 % | BODY MASS INDEX: 33.46 KG/M2 | HEIGHT: 64 IN | WEIGHT: 196 LBS | DIASTOLIC BLOOD PRESSURE: 86 MMHG

## 2024-12-10 DIAGNOSIS — I10 PRIMARY HYPERTENSION: ICD-10-CM

## 2024-12-10 DIAGNOSIS — E11.9 TYPE 2 DIABETES MELLITUS WITHOUT COMPLICATION, WITHOUT LONG-TERM CURRENT USE OF INSULIN: ICD-10-CM

## 2024-12-10 DIAGNOSIS — E11.649 TYPE 2 DIABETES MELLITUS WITH HYPOGLYCEMIA WITHOUT COMA, WITHOUT LONG-TERM CURRENT USE OF INSULIN: Primary | ICD-10-CM

## 2024-12-10 LAB
EXPIRATION DATE: ABNORMAL
HBA1C MFR BLD: 6 % (ref 4.5–5.7)
Lab: ABNORMAL

## 2024-12-10 PROCEDURE — 83036 HEMOGLOBIN GLYCOSYLATED A1C: CPT | Performed by: PHYSICIAN ASSISTANT

## 2024-12-10 PROCEDURE — 99214 OFFICE O/P EST MOD 30 MIN: CPT | Performed by: PHYSICIAN ASSISTANT

## 2024-12-10 RX ORDER — LOSARTAN POTASSIUM 50 MG/1
50 TABLET ORAL DAILY
Qty: 90 TABLET | Refills: 1 | Status: SHIPPED | OUTPATIENT
Start: 2024-12-10

## 2024-12-15 DIAGNOSIS — R92.8 ABNORMAL MAMMOGRAM: Primary | ICD-10-CM

## 2024-12-16 DIAGNOSIS — R92.8 ABNORMAL MAMMOGRAM: Primary | ICD-10-CM

## 2024-12-19 NOTE — PROGRESS NOTES
"Chief Complaint  Diabetes (3 month follow up)    Subjective        Marisabel Burns presents to [facility]  Diabetes        History of Present Illness  The patient is a 54-year-old female who presents for evaluation of diabetes, hypertension, cirrhosis, GERD, and health maintenance.    She reports having type 2 diabetes.  States has been tolerating Jardiance well, which she takes daily. Her last eye exam was in 11/2023, and she plans to schedule another after [place].    Reports hypertension.  She monitors her blood pressure at home, which typically reads in the 120s.    She has been diagnosed with nonalcoholic fatty liver disease with cirrhosis and is currently on medication prescribed by her gastroenterologist. She also reports an elevated iron level in her blood work.    She continues to take pantoprazole for gastritis and uses dicyclomine as needed, although infrequently.    Her last Pap smear was conducted in 2015, coinciding with her hysterectomy. She has declined the influenza vaccine at this time. She is scheduled for a mammogram tomorrow.          IMMUNIZATIONS  She declined the influenza vaccine.      Objective   Vital Signs:  /86 (BP Location: Left arm, Patient Position: Sitting, Cuff Size: Large Adult)   Pulse 80   Ht 162.6 cm (64\")   Wt 88.9 kg (196 lb)   SpO2 98%   BMI 33.64 kg/m²   Estimated body mass index is 33.64 kg/m² as calculated from the following:    Height as of this encounter: 162.6 cm (64\").    Weight as of this encounter: 88.9 kg (196 lb).            Physical Exam  Vitals and nursing note reviewed.   Constitutional:       General: She is not in acute distress.     Appearance: Normal appearance.   HENT:      Head: Normocephalic.      Right Ear: Hearing normal.      Left Ear: Hearing normal.   Eyes:      Pupils: Pupils are equal, round, and reactive to light.   Cardiovascular:      Rate and Rhythm: Normal rate and regular rhythm.   Pulmonary:      " Effort: Pulmonary effort is normal. No respiratory distress.   Skin:     General: Skin is warm and dry.   Neurological:      Mental Status: She is alert.   Psychiatric:         Mood and Affect: Mood normal.        Result Review :                Assessment and Plan   Diagnoses and all orders for this visit:    1. Type 2 diabetes mellitus with hypoglycemia without coma, without long-term current use of insulin (Primary)  -     POC Glycosylated Hemoglobin (Hb A1C)    2. Type 2 diabetes mellitus without complication, without long-term current use of insulin  -     empagliflozin (Jardiance) 10 MG tablet tablet; Take 1 tablet by mouth Daily. For diabetes  Dispense: 90 tablet; Refill: 1    3. Primary hypertension  -     losartan (COZAAR) 50 MG tablet; Take 1 tablet by mouth Daily. for blood pressure  Dispense: 90 tablet; Refill: 1             Assessment & Plan  1. Diabetes Mellitus.  Her blood glucose levels have shown significant improvement, with an A1c currently at 6, down from previous readings of 6.7 and 6.8. She is advised to continue her current dietary modifications and maintain her Jardiance regimen. A prescription refill for Jardiance will be provided. She is also encouraged to inform her ophthalmologist about her diabetes during her upcoming eye examination.    2. Hypertension.  Her blood pressure readings at home have been stable, averaging in the 120s. A prescription refill for losartan will be provided.    3. Cirrhosis.  She is currently on a medication prescribed by her gastroenterologist for cirrhosis.     4. Gastroesophageal Reflux Disease (GERD).  She continues to take pantoprazole for reflux or gastritis.      Follow-up  The patient will follow up in 6 months.    PROCEDURE  The patient underwent a hysterectomy in 2015.      Follow Up   Return in about 6 months (around 6/10/2025).  Patient was given instructions and counseling regarding her condition or for health maintenance advice. Please see specific  information pulled into the AVS if appropriate.     Patient or patient representative verbalized consent for the use of Ambient Listening during the visit with  Karlene Ronquillo PA-C for chart documentation. 12/19/2024  15:12 EST

## 2025-02-10 DIAGNOSIS — E11.9 TYPE 2 DIABETES MELLITUS WITHOUT COMPLICATION, WITHOUT LONG-TERM CURRENT USE OF INSULIN: Primary | ICD-10-CM

## 2025-02-14 DIAGNOSIS — R92.8 ABNORMAL MAMMOGRAM: Primary | ICD-10-CM

## 2025-05-07 ENCOUNTER — OFFICE VISIT (OUTPATIENT)
Dept: GASTROENTEROLOGY | Facility: CLINIC | Age: 55
End: 2025-05-07
Payer: COMMERCIAL

## 2025-05-07 VITALS
HEIGHT: 64 IN | HEART RATE: 73 BPM | SYSTOLIC BLOOD PRESSURE: 123 MMHG | TEMPERATURE: 96.9 F | DIASTOLIC BLOOD PRESSURE: 82 MMHG | BODY MASS INDEX: 33.9 KG/M2 | WEIGHT: 198.6 LBS

## 2025-05-07 DIAGNOSIS — R10.84 GENERALIZED ABDOMINAL PAIN: ICD-10-CM

## 2025-05-07 DIAGNOSIS — K74.3 PRIMARY BILIARY CHOLANGITIS: Primary | ICD-10-CM

## 2025-05-07 PROCEDURE — 99214 OFFICE O/P EST MOD 30 MIN: CPT

## 2025-05-07 RX ORDER — DICYCLOMINE HCL 20 MG
20 TABLET ORAL EVERY 6 HOURS PRN
Qty: 120 TABLET | Refills: 0 | Status: SHIPPED | OUTPATIENT
Start: 2025-05-07

## 2025-05-07 NOTE — PROGRESS NOTES
Office Note     Name: Marisabel Burns    : 1970     MRN: [id number]     Chief Complaint  6 month follow up    Subjective     History of Present Illness:  History of Present Illness  The patient is a 54-year-old female who presents today for follow-up of GERD and primary biliary cholangitis, which was diagnosed after her last visit, with elevated mitochondrial ab of 52.8. She was initiated on ursodiol and has tolerated well with no adverse effects and denies any pruritis.     She reports no new health concerns and has been managing her condition well with daily pantoprazole 40 mg. Heartburn and reflux symptoms have improved, which she attributes to dietary modifications aimed at weight loss. She has not required the addition of famotidine or Pepcid at night.     She finds dicyclomine beneficial for cramping, typically taken at night.     PAST SURGICAL HISTORY:  Endoscopy and colonoscopy in .    SOCIAL HISTORY  Diet: Trying to get healthier and lose weight, avoiding fatty foods.    FAMILY HISTORY  - Negative for liver disease    Past Medical History:   Past Medical History:   Diagnosis Date    Diabetes mellitus     GERD (gastroesophageal reflux disease)     Hypertension     Liver disease        Past Surgical History:   Past Surgical History:   Procedure Laterality Date    COLONOSCOPY  21    HYSTERECTOMY      TUBAL ABDOMINAL LIGATION         Immunizations:   Immunization History   Administered Date(s) Administered    COVID-19 (MODERNA) 1st,2nd,3rd Dose Monovalent 2021, 05/10/2021    COVID-19 (MODERNA) Monovalent Original Booster 12/15/2021    Fluzone (or Fluarix & Flulaval for VFC) >6mos 2021        Medications:     Current Outpatient Medications:     dicyclomine (BENTYL) 20 MG tablet, Take 1 tablet by mouth As Needed., Disp: , Rfl:     Ertugliflozin L-PyroglutamicAc (Steglatro) 5 MG tablet, Take 1 tablet by mouth Every Morning., Disp: 90 tablet, Rfl: 1    losartan (COZAAR) 50 MG tablet,  "Take 1 tablet by mouth Daily. for blood pressure, Disp: 90 tablet, Rfl: 1    pantoprazole (PROTONIX) 40 MG EC tablet, Take 1 tablet by mouth Daily. For acid reflux, Disp: 90 tablet, Rfl: 3    ursodiol (ACTIGALL) 500 MG tablet, Take 1 tablet by mouth 2 (Two) Times a Day. If side effects occur, may try taking both doses (1000 mg) by mouth once nightly at bedtime., Disp: 60 tablet, Rfl: 11    Allergies:   Allergies   Allergen Reactions    Mounjaro [Tirzepatide] GI Intolerance       Family History:   Family History   Problem Relation Age of Onset    Asthma Mother     Heart disease Mother     Hyperlipidemia Mother     Heart disease Father     Hyperlipidemia Father     Stroke Father     Liver cancer Neg Hx     Liver disease Neg Hx     Rectal cancer Neg Hx     Stomach cancer Neg Hx     Colon cancer Neg Hx        Social History:   Social History     Socioeconomic History    Marital status:    Tobacco Use    Smoking status: Former     Current packs/day: 0.00     Average packs/day: 0.5 packs/day for 15.0 years (7.5 ttl pk-yrs)     Types: Cigarettes     Start date:      Quit date:      Years since quittin.3    Smokeless tobacco: Never   Vaping Use    Vaping status: Some Days    Substances: Nicotine   Substance and Sexual Activity    Alcohol use: Never    Drug use: Never    Sexual activity: Not Currently     Partners: Male     Birth control/protection: None, Tubal ligation         Objective     Vital Signs  /82 (BP Location: Left arm, Patient Position: Sitting, Cuff Size: Adult)   Pulse 73   Temp 96.9 °F (36.1 °C) (Temporal)   Ht 162.6 cm (64\")   Wt 90.1 kg (198 lb 9.6 oz)   BMI 34.09 kg/m²   Estimated body mass index is 34.09 kg/m² as calculated from the following:    Height as of this encounter: 162.6 cm (64\").    Weight as of this encounter: 90.1 kg (198 lb 9.6 oz).            Physical Exam  Vitals reviewed.   Constitutional:       General: She is awake.   Cardiovascular:      Rate and Rhythm: " Normal rate.   Pulmonary:      Effort: Pulmonary effort is normal.   Abdominal:      Palpations: Abdomen is soft.      Tenderness: There is no abdominal tenderness.   Skin:     General: Skin is warm and dry.   Neurological:      Mental Status: She is alert.        Physical Exam  General: Patient appears well and in no distress.  Abdomen: No tenderness or discomfort noted upon palpation.    Results  Labs   - Liver enzymes: 11/2024, Elevated   - Mitochondrial antibodies: 11/2024, Elevated   - Ferritin: 11/2024, Slightly elevated      Assessment and Plan     Assessment & Plan  Primary biliary cholangitis    Orders:    Comprehensive Metabolic Panel    TSH    IgG; Future    Generalized abdominal pain    Orders:    dicyclomine (BENTYL) 20 MG tablet; Take 1 tablet by mouth Every 6 (Six) Hours As Needed for Abdominal Cramping.       Assessment & Plan  1. Primary Biliary Cholangitis:  - Elevated mitochondrial antibodies suggest primary biliary cholangitis. MICHAEL negative.   - Currently on ursodiol and tolerating it well without side effects.  - Comprehensive discussion regarding the nature of primary biliary cholangitis conducted.  - Order laboratory test to monitor liver enzymes.  - If liver enzymes are not within the desired range, consider increasing the dosage of ursodiol.   - Annual TSH due to increased risk of hypothyroidism with PBC.   - DEXA scan every 2-4 years.     Lab Results   Component Value Date    AST 66 (H) 11/06/2024    ALT 62 (H) 11/06/2024    ALKPHOS 158 (H) 11/06/2024    BILITOT 0.3 11/06/2024       2. Gastroesophageal Reflux Disease (GERD):  - Symptoms have improved significantly with dietary changes and weight loss efforts.  - Currently taking pantoprazole once a day.            Follow Up  6 months    Patient or patient representative verbalized consent for the use of Ambient Listening during the visit with  NEYMAR Holbrook for chart documentation. 5/7/2025  09:10 EDT    NEYMAR Holbrook  MGE  [facility] [address]  [facility]  [address] [phone]

## 2025-05-21 ENCOUNTER — LAB (OUTPATIENT)
Dept: LAB | Facility: HOSPITAL | Age: 55
End: 2025-05-21
Payer: COMMERCIAL

## 2025-05-21 DIAGNOSIS — K74.3 PRIMARY BILIARY CHOLANGITIS: ICD-10-CM

## 2025-05-21 LAB
ALBUMIN SERPL-MCNC: 4.2 G/DL (ref 3.5–5.2)
ALBUMIN/GLOB SERPL: 1.3 G/DL
ALP SERPL-CCNC: 150 U/L (ref 39–117)
ALT SERPL W P-5'-P-CCNC: 53 U/L (ref 1–33)
ANION GAP SERPL CALCULATED.3IONS-SCNC: 12 MMOL/L (ref 5–15)
AST SERPL-CCNC: 61 U/L (ref 1–32)
BILIRUB SERPL-MCNC: 0.3 MG/DL (ref 0–1.2)
BUN SERPL-MCNC: 10 MG/DL (ref 6–20)
BUN/CREAT SERPL: 14.3 (ref 7–25)
CALCIUM SPEC-SCNC: 9.4 MG/DL (ref 8.6–10.5)
CHLORIDE SERPL-SCNC: 103 MMOL/L (ref 98–107)
CO2 SERPL-SCNC: 25 MMOL/L (ref 22–29)
CREAT SERPL-MCNC: 0.7 MG/DL (ref 0.57–1)
EGFRCR SERPLBLD CKD-EPI 2021: 102.3 ML/MIN/1.73
GLOBULIN UR ELPH-MCNC: 3.2 GM/DL
GLUCOSE SERPL-MCNC: 137 MG/DL (ref 65–99)
IGG1 SER-MCNC: 1090 MG/DL (ref 700–1600)
POTASSIUM SERPL-SCNC: 3.8 MMOL/L (ref 3.5–5.2)
PROT SERPL-MCNC: 7.4 G/DL (ref 6–8.5)
SODIUM SERPL-SCNC: 140 MMOL/L (ref 136–145)
TSH SERPL DL<=0.05 MIU/L-ACNC: 1.73 UIU/ML (ref 0.27–4.2)

## 2025-05-21 PROCEDURE — 82784 ASSAY IGA/IGD/IGG/IGM EACH: CPT

## 2025-05-21 PROCEDURE — 84443 ASSAY THYROID STIM HORMONE: CPT

## 2025-05-21 PROCEDURE — 80053 COMPREHEN METABOLIC PANEL: CPT

## 2025-05-26 ENCOUNTER — RESULTS FOLLOW-UP (OUTPATIENT)
Dept: GASTROENTEROLOGY | Facility: CLINIC | Age: 55
End: 2025-05-26
Payer: COMMERCIAL

## 2025-05-26 DIAGNOSIS — K74.3 PRIMARY BILIARY CHOLANGITIS: Primary | ICD-10-CM

## 2025-05-26 RX ORDER — URSODIOL 300 MG/1
300 CAPSULE ORAL DAILY
Qty: 90 CAPSULE | Refills: 3 | Status: SHIPPED | OUTPATIENT
Start: 2025-05-26

## 2025-05-28 NOTE — TELEPHONE ENCOUNTER
I called patient and informed her of the changes to her medication and results per Roberta BLACKMON. Patient verbalized understanding and had no further questions.

## 2025-05-28 NOTE — TELEPHONE ENCOUNTER
----- Message from Roberta Mitchell sent at 5/26/2025  6:17 PM EDT -----  Regarding: ursodiol  Please let patient know that her liver enzymes have not shown as much improvement as I'd like to see on the ursodiol. I would like to increase her total daily dose to 1,300 mg daily. She can continue   to use the 500 mg tablets she has, but I've also sent a prescription for 300 mg tablets to her pharmacy. She will take one 500 mg tablet in the morning, and her second dose at night will be 500 mg   plus a 300 mg tablet to total 800 mg at night. This is the optimal ursodiol dosage for her body weight and if we don't see improvement in the next 6 months, we may need to consider adding a second   agent like Livdelzi, but can discuss this at her next office visit. Thanks!  NEYMAR Holbrook  ----- Message -----  From: Lab, Background User  Sent: 5/21/2025  11:36 PM EDT  To: NEYMAR Holbrook

## 2025-06-16 ENCOUNTER — OFFICE VISIT (OUTPATIENT)
Dept: FAMILY MEDICINE CLINIC | Facility: CLINIC | Age: 55
End: 2025-06-16
Payer: COMMERCIAL

## 2025-06-16 VITALS
BODY MASS INDEX: 33.67 KG/M2 | HEIGHT: 64 IN | HEART RATE: 85 BPM | SYSTOLIC BLOOD PRESSURE: 126 MMHG | DIASTOLIC BLOOD PRESSURE: 82 MMHG | OXYGEN SATURATION: 98 % | WEIGHT: 197.2 LBS

## 2025-06-16 DIAGNOSIS — I10 PRIMARY HYPERTENSION: ICD-10-CM

## 2025-06-16 DIAGNOSIS — E11.9 TYPE 2 DIABETES MELLITUS WITHOUT COMPLICATION, WITHOUT LONG-TERM CURRENT USE OF INSULIN: ICD-10-CM

## 2025-06-16 DIAGNOSIS — E66.01 MORBID (SEVERE) OBESITY DUE TO EXCESS CALORIES: ICD-10-CM

## 2025-06-16 DIAGNOSIS — Z00.00 PHYSICAL EXAM: Primary | ICD-10-CM

## 2025-06-16 DIAGNOSIS — Z87.891 HISTORY OF TOBACCO USE: ICD-10-CM

## 2025-06-16 LAB
EXPIRATION DATE: ABNORMAL
EXPIRATION DATE: ABNORMAL
HBA1C MFR BLD: 5.8 % (ref 4.5–5.7)
Lab: ABNORMAL
Lab: ABNORMAL
POC ALBUMIN, URINE: 30 MG/L
POC CREATININE, URINE: 50 MG/DL
POC URINE ALB/CREA RATIO: ABNORMAL MG/G

## 2025-06-16 PROCEDURE — 83036 HEMOGLOBIN GLYCOSYLATED A1C: CPT | Performed by: PHYSICIAN ASSISTANT

## 2025-06-16 PROCEDURE — 82570 ASSAY OF URINE CREATININE: CPT | Performed by: PHYSICIAN ASSISTANT

## 2025-06-16 PROCEDURE — 99213 OFFICE O/P EST LOW 20 MIN: CPT | Performed by: PHYSICIAN ASSISTANT

## 2025-06-16 PROCEDURE — 82044 UR ALBUMIN SEMIQUANTITATIVE: CPT | Performed by: PHYSICIAN ASSISTANT

## 2025-06-16 PROCEDURE — 99396 PREV VISIT EST AGE 40-64: CPT | Performed by: PHYSICIAN ASSISTANT

## 2025-06-16 RX ORDER — DAPAGLIFLOZIN 5 MG/1
5 TABLET, FILM COATED ORAL DAILY
Qty: 14 TABLET | Refills: 0 | COMMUNITY
Start: 2025-06-16

## 2025-06-16 RX ORDER — DAPAGLIFLOZIN 10 MG/1
10 TABLET, FILM COATED ORAL DAILY
Qty: 14 TABLET | Refills: 0 | COMMUNITY
Start: 2025-06-16

## 2025-06-16 RX ORDER — LOSARTAN POTASSIUM 50 MG/1
50 TABLET ORAL DAILY
Qty: 90 TABLET | Refills: 1 | Status: SHIPPED | OUTPATIENT
Start: 2025-06-16

## 2025-06-16 NOTE — ASSESSMENT & PLAN NOTE
Diabetes is improving with treatment.   Medication changes per orders.  Recommended an ADA diet.  Regular aerobic exercise.  Diabetes will be reassessed in 1 month.  Patient will send [employer] message notifying of progress with Farxiga

## 2025-06-16 NOTE — ASSESSMENT & PLAN NOTE
Patient's (Body mass index is 33.85kg/m².) indicates that they are morbidly/severely obese (BMI > 40 or > 35 with obesity - related health condition) with health conditions that include GERD . Weight is improved. BMI  is above average; BMI management plan is completed. We discussed low calorie, low carb based diet program, portion control, increasing exercise, and will start Farxiga which may help with weight  management.

## 2025-06-16 NOTE — PROGRESS NOTES
"Chief Complaint  Diabetes (6 month f/u.)    Subjective          Marisabel Burns presents to [facility]  Diabetes      Patient reports today for physical exam, medication refills and fasting labs.     Patient has hypertension reports she has been checking her blood pressure at home and it has been normal.  Reports taking her BP meds daily.  Patient denies any chest pain, palpitations or shortness of breath.     Patient reports having fatty liver disease, acid reflux with esophagitis and gastritis.  Patient reports she takes pantoprazole and Ursodiol daily.  Reports symptoms are well controlled.  Continues follow up with GI.       Patient reports DM2 states she is managing diet and taking Steglatro.  Patient however states it is too expensive and she would like to try something different.  Reports vaginitis with Jardiance.  .    Objective   Vital Signs:   /82 (BP Location: Left arm, Patient Position: Sitting, Cuff Size: Adult)   Pulse 85   Ht 162.6 cm (64\")   Wt 89.4 kg (197 lb 3.2 oz)   SpO2 98%   BMI 33.85 kg/m²     Body mass index is 33.85 kg/m².    Review of Systems    Health Maintenance   Topic Date Due    DIABETIC FOOT EXAM  Never done    Hepatitis B (1 of 3 - 19+ 3-dose series) Never done    ANNUAL PHYSICAL  05/15/2025    COVID-19 Vaccine (4 - 2024-25 season) 06/30/2025 (Originally 9/1/2024)    ZOSTER VACCINE (1 of 2) 06/30/2025 (Originally 10/19/2019)    Pneumococcal Vaccine 50+ (1 of 2 - PCV) 06/16/2026 (Originally 10/19/1988)    TDAP/TD VACCINES (1 - Tdap) 06/16/2026 (Originally 10/19/1988)    INFLUENZA VACCINE  07/01/2025    LIPID PANEL  11/06/2025    HEMOGLOBIN A1C  12/16/2025    DIABETIC EYE EXAM  05/01/2026    URINE MICROALBUMIN-CREATININE RATIO (uACR)  06/16/2026    MAMMOGRAM  02/13/2027    COLORECTAL CANCER SCREENING  07/07/2032    HEPATITIS C SCREENING  Completed        Past History:  Medical History: has a past medical history of Diabetes mellitus, Fatty liver " (03/29/2022), GERD (gastroesophageal reflux disease), Hernia (2021), Hypertension, and Liver disease.   Surgical History: has a past surgical history that includes Hysterectomy; Tubal ligation; Colonoscopy (07/06/22); and Upper gastrointestinal endoscopy (07/06/22).   Family History: family history includes Asthma in her mother; Diabetes in her mother; Heart disease in her father and mother; Hyperlipidemia in her father and mother; Hypertension in her father and mother; Stroke in her father.   Social History: reports that she quit smoking about 17 months ago. Her smoking use included cigarettes. She started smoking about 16 years ago. She has a 7.5 pack-year smoking history. She has never used smokeless tobacco. She reports that she does not drink alcohol and does not use drugs.      Current Outpatient Medications:     dicyclomine (BENTYL) 20 MG tablet, Take 1 tablet by mouth Every 6 (Six) Hours As Needed for Abdominal Cramping., Disp: 120 tablet, Rfl: 0    losartan (COZAAR) 50 MG tablet, Take 1 tablet by mouth Daily. for blood pressure, Disp: 90 tablet, Rfl: 1    pantoprazole (PROTONIX) 40 MG EC tablet, Take 1 tablet by mouth Daily. For acid reflux, Disp: 90 tablet, Rfl: 3    ursodiol (ACTIGALL) 300 MG capsule, Take 1 capsule by mouth Daily., Disp: 90 capsule, Rfl: 3    ursodiol (ACTIGALL) 500 MG tablet, Take 1 tablet by mouth 2 (Two) Times a Day. If side effects occur, may try taking both doses (1000 mg) by mouth once nightly at bedtime., Disp: 60 tablet, Rfl: 11    dapagliflozin (Farxiga) 5 MG tablet tablet, Take 1 tablet by mouth Daily., Disp: 14 tablet, Rfl: 0    dapagliflozin Propanediol (Farxiga) 10 MG tablet, Take 10 mg by mouth Daily., Disp: 14 tablet, Rfl: 0    Allergies: Mounjaro [tirzepatide]    Physical Exam  Vitals and nursing note reviewed.   Constitutional:       General: She is not in acute distress.     Appearance: She is obese. She is not ill-appearing.   HENT:      Head: Normocephalic.      Right  Ear: Tympanic membrane, ear canal and external ear normal.      Left Ear: Tympanic membrane, ear canal and external ear normal.      Nose: Nose normal.      Mouth/Throat:      Mouth: Mucous membranes are moist.   Eyes:      Pupils: Pupils are equal, round, and reactive to light.   Cardiovascular:      Rate and Rhythm: Normal rate and regular rhythm.      Pulses: Normal pulses.   Pulmonary:      Effort: Pulmonary effort is normal. No respiratory distress.   Abdominal:      General: Bowel sounds are normal.      Palpations: Abdomen is soft.      Tenderness: There is no abdominal tenderness. There is no guarding or rebound.   Musculoskeletal:         General: Normal range of motion.      Cervical back: Normal range of motion.   Skin:     General: Skin is warm and dry.      Capillary Refill: Capillary refill takes less than 2 seconds.   Neurological:      General: No focal deficit present.      Mental Status: She is oriented to person, place, and time.   Psychiatric:         Mood and Affect: Mood normal.          Result Review :                   Assessment and Plan    Diagnoses and all orders for this visit:    1. Physical exam (Primary)  Assessment & Plan:  Discussed injury prevention, diet and exercise, safe sexual practices, and screening for common diseases. Encouraged use of sunscreen and seatbelts. Encouraged SBE, avoidance of tobacco, limiting alcohol, and yearly dental and eye exams.       2. Type 2 diabetes mellitus without complication, without long-term current use of insulin  Assessment & Plan:  Diabetes is improving with treatment.   Medication changes per orders.  Recommended an ADA diet.  Regular aerobic exercise.  Diabetes will be reassessed in 1 month.  Patient will send [employer] message notifying of progress with Farxiga    Orders:  -     POC Glycosylated Hemoglobin (Hb A1C)  -     POC Albumin/Creatinine Ratio Urine  -     dapagliflozin Propanediol (Farxiga) 10 MG tablet; Take 10 mg by mouth Daily.   Dispense: 14 tablet; Refill: 0  -     dapagliflozin (Farxiga) 5 MG tablet tablet; Take 1 tablet by mouth Daily.  Dispense: 14 tablet; Refill: 0    3. Morbid (severe) obesity due to excess calories  Assessment & Plan:  Patient's (Body mass index is 33.85kg/m².) indicates that they are morbidly/severely obese (BMI > 40 or > 35 with obesity - related health condition) with health conditions that include GERD . Weight is improved. BMI  is above average; BMI management plan is completed. We discussed low calorie, low carb based diet program, portion control, increasing exercise, and will start Farxiga which may help with weight  management.       4. Primary hypertension  Assessment & Plan:  Hypertension is stable and controlled  Continue current treatment regimen.  Dietary sodium restriction.  Weight loss.  Blood pressure will be reassessed in 6 months.    Orders:  -     losartan (COZAAR) 50 MG tablet; Take 1 tablet by mouth Daily. for blood pressure  Dispense: 90 tablet; Refill: 1  -     CBC & Differential  -     Lipid Panel    5. History of tobacco use  Assessment & Plan:  Patient declines LDCT this date.  Reports she would like to schedule later in the year when she repeats her mammogram or go on a 2 year schedule              Follow Up   No follow-ups on file.  Patient was given instructions and counseling regarding her condition or for health maintenance advice. Please see specific information pulled into the AVS if appropriate.           Karlene Ronquillo PA-C

## 2025-06-16 NOTE — ASSESSMENT & PLAN NOTE
Patient declines LDCT this date.  Reports she would like to schedule later in the year when she repeats her mammogram or go on a 2 year schedule

## 2025-06-17 LAB
BASOPHILS # BLD AUTO: 0.05 10*3/MM3 (ref 0–0.2)
BASOPHILS NFR BLD AUTO: 0.7 % (ref 0–1.5)
CHOLEST SERPL-MCNC: 159 MG/DL (ref 0–200)
EOSINOPHIL # BLD AUTO: 0.09 10*3/MM3 (ref 0–0.4)
EOSINOPHIL NFR BLD AUTO: 1.2 % (ref 0.3–6.2)
ERYTHROCYTE [DISTWIDTH] IN BLOOD BY AUTOMATED COUNT: 12 % (ref 12.3–15.4)
HCT VFR BLD AUTO: 43 % (ref 34–46.6)
HDLC SERPL-MCNC: 42 MG/DL (ref 40–60)
HGB BLD-MCNC: 13.9 G/DL (ref 12–15.9)
IMM GRANULOCYTES # BLD AUTO: 0.02 10*3/MM3 (ref 0–0.05)
IMM GRANULOCYTES NFR BLD AUTO: 0.3 % (ref 0–0.5)
LDLC SERPL CALC-MCNC: 97 MG/DL (ref 0–100)
LYMPHOCYTES # BLD AUTO: 2.48 10*3/MM3 (ref 0.7–3.1)
LYMPHOCYTES NFR BLD AUTO: 33 % (ref 19.6–45.3)
MCH RBC QN AUTO: 29.1 PG (ref 26.6–33)
MCHC RBC AUTO-ENTMCNC: 32.3 G/DL (ref 31.5–35.7)
MCV RBC AUTO: 90 FL (ref 79–97)
MONOCYTES # BLD AUTO: 0.58 10*3/MM3 (ref 0.1–0.9)
MONOCYTES NFR BLD AUTO: 7.7 % (ref 5–12)
NEUTROPHILS # BLD AUTO: 4.29 10*3/MM3 (ref 1.7–7)
NEUTROPHILS NFR BLD AUTO: 57.1 % (ref 42.7–76)
NRBC BLD AUTO-RTO: 0 /100 WBC (ref 0–0.2)
PLATELET # BLD AUTO: 182 10*3/MM3 (ref 140–450)
RBC # BLD AUTO: 4.78 10*6/MM3 (ref 3.77–5.28)
TRIGL SERPL-MCNC: 110 MG/DL (ref 0–150)
VLDLC SERPL CALC-MCNC: 20 MG/DL (ref 5–40)
WBC # BLD AUTO: 7.51 10*3/MM3 (ref 3.4–10.8)

## 2025-07-01 DIAGNOSIS — E11.9 TYPE 2 DIABETES MELLITUS WITHOUT COMPLICATION, WITHOUT LONG-TERM CURRENT USE OF INSULIN: ICD-10-CM

## 2025-07-01 RX ORDER — DAPAGLIFLOZIN 10 MG/1
10 TABLET, FILM COATED ORAL DAILY
Qty: 90 TABLET | Refills: 1 | Status: SHIPPED | OUTPATIENT
Start: 2025-07-01